# Patient Record
Sex: MALE | Race: BLACK OR AFRICAN AMERICAN | HISPANIC OR LATINO | Employment: UNEMPLOYED | URBAN - METROPOLITAN AREA
[De-identification: names, ages, dates, MRNs, and addresses within clinical notes are randomized per-mention and may not be internally consistent; named-entity substitution may affect disease eponyms.]

---

## 2018-03-04 ENCOUNTER — HOSPITAL ENCOUNTER (EMERGENCY)
Facility: HOSPITAL | Age: 43
Discharge: HOME | End: 2018-03-04
Attending: EMERGENCY MEDICINE

## 2018-03-04 VITALS
SYSTOLIC BLOOD PRESSURE: 122 MMHG | DIASTOLIC BLOOD PRESSURE: 68 MMHG | HEART RATE: 85 BPM | OXYGEN SATURATION: 97 % | HEIGHT: 74 IN | BODY MASS INDEX: 34.31 KG/M2 | RESPIRATION RATE: 18 BRPM | TEMPERATURE: 98 F | WEIGHT: 267.38 LBS

## 2018-03-04 DIAGNOSIS — Z72.0 TOBACCO ABUSE: ICD-10-CM

## 2018-03-04 DIAGNOSIS — L03.116 CELLULITIS AND ABSCESS OF LEFT LEG: Primary | ICD-10-CM

## 2018-03-04 DIAGNOSIS — L02.416 CELLULITIS AND ABSCESS OF LEFT LEG: Primary | ICD-10-CM

## 2018-03-04 PROCEDURE — 87070 CULTURE OTHR SPECIMN AEROBIC: CPT

## 2018-03-04 PROCEDURE — 25000003 PHARM REV CODE 250: Performed by: EMERGENCY MEDICINE

## 2018-03-04 PROCEDURE — 99284 EMERGENCY DEPT VISIT MOD MDM: CPT | Mod: 25

## 2018-03-04 PROCEDURE — 96372 THER/PROPH/DIAG INJ SC/IM: CPT

## 2018-03-04 PROCEDURE — 10022: CPT

## 2018-03-04 PROCEDURE — S0077 INJECTION, CLINDAMYCIN PHOSP: HCPCS | Performed by: EMERGENCY MEDICINE

## 2018-03-04 RX ORDER — CLINDAMYCIN HYDROCHLORIDE 300 MG/1
300 CAPSULE ORAL EVERY 6 HOURS
Qty: 40 CAPSULE | Refills: 0 | Status: SHIPPED | OUTPATIENT
Start: 2018-03-04 | End: 2024-03-27

## 2018-03-04 RX ORDER — LIDOCAINE HYDROCHLORIDE 10 MG/ML
10 INJECTION, SOLUTION EPIDURAL; INFILTRATION; INTRACAUDAL; PERINEURAL
Status: COMPLETED | OUTPATIENT
Start: 2018-03-04 | End: 2018-03-04

## 2018-03-04 RX ORDER — CLINDAMYCIN PHOSPHATE 150 MG/ML
600 INJECTION, SOLUTION INTRAVENOUS
Status: COMPLETED | OUTPATIENT
Start: 2018-03-04 | End: 2018-03-04

## 2018-03-04 RX ADMIN — LIDOCAINE HYDROCHLORIDE 100 MG: 10 INJECTION, SOLUTION EPIDURAL; INFILTRATION; INTRACAUDAL; PERINEURAL at 09:03

## 2018-03-04 RX ADMIN — CLINDAMYCIN PHOSPHATE 600 MG: 150 INJECTION, SOLUTION INTRAMUSCULAR; INTRAVENOUS at 08:03

## 2018-03-04 NOTE — ED PROVIDER NOTES
"Encounter Date: 3/4/2018       History     Chief Complaint   Patient presents with    Leg Pain     pt states "red, swollen area, warm to the touch on left upper leg that's not getting better" thought he might have had a bruise or a pulled muscle, denies injury or drainage     8:32 AM    The patient is a 42-year-old male who presents to the emergency room complaining of pain and sensitivity to the left lateral thigh.  Patient had been losing weight but then plateaued.  He states that he tried some herbal weight loss product which was an IM injection into the left lateral thigh approximately one week ago.  He says that it was sensitive-feeling like a bruise.  He states that it then worsened with redness warmth and erythema.  When he is resting, there is no pain. Redness started worsening and spreading.  When he is walking, pain rated 7 out of 10.  Patient denies any nausea, vomiting, malaise, chills, body aches, fever, weakness or immunosuppression.          Review of patient's allergies indicates:  No Known Allergies  History reviewed. No pertinent past medical history.  Past Surgical History:   Procedure Laterality Date    TONSILLECTOMY       History reviewed. No pertinent family history.  Social History   Substance Use Topics    Smoking status: Current Some Day Smoker     Types: Cigars    Smokeless tobacco: Never Used    Alcohol use Yes      Comment: 2- 3 x a week     Review of Systems   Constitutional: Negative for fever.   HENT: Negative for sore throat.    Respiratory: Negative for shortness of breath.    Cardiovascular: Negative for chest pain.   Gastrointestinal: Negative for nausea.   Genitourinary: Negative for dysuria.   Musculoskeletal: Negative for back pain.   Skin: Positive for rash.   Neurological: Negative for weakness.   Hematological: Does not bruise/bleed easily.       Physical Exam     Initial Vitals [03/04/18 0826]   BP Pulse Resp Temp SpO2   134/83 85 18 98.3 °F (36.8 °C) 99 %      MAP     " "  100           Vitals:    03/04/18 0826 03/04/18 0931   BP: 134/83 122/68   Pulse: 85 85   Resp: 18 18   Temp: 98.3 °F (36.8 °C)    TempSrc: Oral    SpO2: 99% 97%   Weight: 121.3 kg (267 lb 6.4 oz)    Height: 6' 2" (1.88 m)        Physical Exam    Nursing note and vitals reviewed.  Constitutional: He appears well-developed and well-nourished.   HENT:   Head: Normocephalic.   Mouth/Throat: Oropharynx is clear and moist.   Eyes: Pupils are equal, round, and reactive to light.   Neck: Normal range of motion.   Cardiovascular: Normal rate, regular rhythm, normal heart sounds and intact distal pulses.   Pulmonary/Chest: Breath sounds normal.   Abdominal: Soft. Bowel sounds are normal. There is no tenderness. There is no rebound and no guarding.   Musculoskeletal: Normal range of motion.   Neurological: He is alert and oriented to person, place, and time. He has normal strength. No cranial nerve deficit.   Skin: Skin is warm. Capillary refill takes less than 2 seconds. There is erythema.   There is approximately 15 cm area of warmth and erythema on the anterior left thigh wrapping around to the lateral thigh.  There is no inguinal involvement.  See photo   Psychiatric: He has a normal mood and affect. Judgment and thought content normal.               Imaging Results          US Soft Tissue Misc (Final result)  Result time 03/04/18 10:38:43    Final result by Scott Machado MD (03/04/18 10:38:43)                 Impression:        See above      Electronically signed by: SCOTT MACHADO MD  Date:     03/04/18  Time:    10:38              Narrative:    History: Rule out abscess    Procedure: Limited ultrasound    CPT 4: 24223    Findings:    Limited ultrasound of the left leg superior to the knee within the area palpable abnormality demonstrates ill-defined fluid collection within the subcutaneous tissues. This does not have the appearance of a well formed abscess. Aspiration could be performed if there is concern for " abscess.                            Imaging Results          US Soft Tissue Misc (Final result)  Result time 03/04/18 10:38:43    Final result by Scott Machado MD (03/04/18 10:38:43)                 Impression:        See above      Electronically signed by: SCOTT MACHADO MD  Date:     03/04/18  Time:    10:38              Narrative:    History: Rule out abscess    Procedure: Limited ultrasound    CPT 4: 20326    Findings:    Limited ultrasound of the left leg superior to the knee within the area palpable abnormality demonstrates ill-defined fluid collection within the subcutaneous tissues. This does not have the appearance of a well formed abscess. Aspiration could be performed if there is concern for abscess.                              ED Course   I & D - Incision and Drainage  Date/Time: 3/4/2018 10:00 AM  Performed by: SRINI JACOBSON  Authorized by: SRINI JACOBSON   Consent Done: Yes  Consent: Verbal consent obtained.  Consent given by: patient  Type: abscess  Anesthesia: local infiltration    Anesthesia:  Local Anesthetic: lidocaine 1% without epinephrine (4 ml)  Anesthetic total: 4 mL  Description of findings: localized abscess on US.   Specimens: Yes (Abscess)  Comments: Discussed with patient and incision and drainage is the best treatment.  Patient is concerned that he is going out of country to [facility] in 48 hours.   He does not want an open incision.  He states that he know that he will not take care of it as should.   He is requesting needle aspiration.       The area was cleaned with Betadine.  After localized intradermal administration of lidocaine 1%, and 18-gauge needle was introduced into the skin under bedside ultrasound guidance.  3 ML's of purulent/bloody fluid was aspirated.  Another pocket was identified on ultrasound, 18-gauge needle was introduced and aspirated.  One mL of bloody purulent fluid was removed.  Patient tolerated procedure well.  Culture was sent to lab.              Labs Reviewed   CULTURE, AEROBIC  (SPECIFY SOURCE)   CULTURE, AEROBIC  (SPECIFY SOURCE)                 Medications   clindamycin injection 600 mg (600 mg Intramuscular Given 3/4/18 6995)   lidocaine (PF) 10 mg/ml (1%) injection 100 mg (100 mg Infiltration Given by Other 3/4/18 3106)     9:30 AM patient reportedly has a collection of fluid per ultrasound.  At this point time, discussed risks options for incision and drainage.  Patient is concerned because he is going to [place].  Discussed risks including infection, bleeding, failure to drain the abscess,, worsening cellulitis cosmetic deformity.  Benefits include resolution of cellulitis.  At this point time, patient would like to proceed with procedure.      10:14 AM Reassessment: Dr. Samuels reassessed the pt.  The pt is resting comfortably and is NAD.  Pt states their sx have improved. Discussed test results, shared treatment plan, specific conditions for return, and the need for f/u.  Again, it was discussed with patient that opened drainage incision is the best treatment.  Again patient is concerned they will have an open wound in [place].  He is requesting needle aspiration.  Patient is fully aware that the abscess may re-collect, the cellulitis can worsen.  Patient was instructed to return to the emergency room for increasing redness, cloudy drainage, fever, chills, weakness, confusion or other concerns.  Answered their questions at this time.  Pt understands and agrees to the plan.  The pt has remained hemodynamically stable through ED course and is stable for discharge.    Follow-up Information     [facility] In 1 day.    Specialty:  Emergency Medicine  Why:  Return to the ED for: increasing redness, fever, nausea, vomiting, weakness, blister or other concerns.  Contact information:  [address] [address] [address] [phone]                 Discharge Medication List as of 3/4/2018 10:15 AM      START  taking these medications    Details   clindamycin (CLEOCIN) 300 MG capsule Take 1 capsule (300 mg total) by mouth every 6 (six) hours., Starting Sun 3/4/2018, Print              Discharge Medication List as of 3/4/2018 10:15 AM          I discussed with patient and/or family/caretaker that evaluation in the ED does not suggest any emergent or life threatening medical conditions requiring immediate intervention beyond what was provided in the ED, and I believe patient is safe for discharge.  Regardless, an unremarkable evaluation in the ED does not preclude the development or presence of a serious of life threatening condition. As such, patient was instructed to return immediately for any worsening or change in current symptoms.        Regarding TOBACCO USE DISORDER, patient was encouraged to:  make changes to lifestyle and habits to help reduce craving for cigarettes; satisfy oral habits in other ways (eat celery or another low-calorie snack, chew sugarless gum, etc.); only frequent public places and restaurants where smoking is prohibited or restricted; eat regular meals and avoid sweet snacks or candy; and to exercise more frequently.  Patient was educated on setting goals for quitting and complications associated with tobacco use. Resources were provided to patient for smoking cessation opportunities offered in the community.                          Clinical Impression:       ICD-10-CM ICD-9-CM   1. Cellulitis and abscess of left leg L03.116 682.6    L02.416    2. Tobacco abuse Z72.0 305.1         Disposition:   Disposition: Discharged  Condition: Stable                        Eloisa SALBADOR Samuels, DO  03/04/18 1206

## 2018-03-07 LAB — BACTERIA SPEC AEROBE CULT: NO GROWTH

## 2019-05-27 ENCOUNTER — HOSPITAL ENCOUNTER (EMERGENCY)
Facility: HOSPITAL | Age: 44
Discharge: HOME | End: 2019-05-27
Attending: EMERGENCY MEDICINE

## 2019-05-27 VITALS
HEIGHT: 74 IN | WEIGHT: 235 LBS | RESPIRATION RATE: 18 BRPM | TEMPERATURE: 99 F | HEART RATE: 69 BPM | OXYGEN SATURATION: 99 % | SYSTOLIC BLOOD PRESSURE: 108 MMHG | DIASTOLIC BLOOD PRESSURE: 78 MMHG | BODY MASS INDEX: 30.16 KG/M2

## 2019-05-27 DIAGNOSIS — N45.1 BILATERAL EPIDIDYMITIS: Primary | ICD-10-CM

## 2019-05-27 DIAGNOSIS — N50.811 PAIN IN RIGHT TESTICLE: ICD-10-CM

## 2019-05-27 LAB
ALBUMIN SERPL BCP-MCNC: 3.6 G/DL (ref 3.5–5.2)
ALP SERPL-CCNC: 82 U/L (ref 55–135)
ALT SERPL W/O P-5'-P-CCNC: 27 U/L (ref 10–44)
ANION GAP SERPL CALC-SCNC: 9 MMOL/L (ref 8–16)
AST SERPL-CCNC: 20 U/L (ref 10–40)
BASOPHILS # BLD AUTO: 0.03 K/UL (ref 0–0.2)
BASOPHILS NFR BLD: 0.4 % (ref 0–1.9)
BILIRUB SERPL-MCNC: 0.4 MG/DL (ref 0.1–1)
BILIRUB UR QL STRIP: NEGATIVE
BUN SERPL-MCNC: 13 MG/DL (ref 6–20)
CALCIUM SERPL-MCNC: 9.8 MG/DL (ref 8.7–10.5)
CHLORIDE SERPL-SCNC: 106 MMOL/L (ref 95–110)
CLARITY UR: CLEAR
CO2 SERPL-SCNC: 25 MMOL/L (ref 23–29)
COLOR UR: YELLOW
CREAT SERPL-MCNC: 1.1 MG/DL (ref 0.5–1.4)
DIFFERENTIAL METHOD: ABNORMAL
EOSINOPHIL # BLD AUTO: 0.2 K/UL (ref 0–0.5)
EOSINOPHIL NFR BLD: 2.4 % (ref 0–8)
ERYTHROCYTE [DISTWIDTH] IN BLOOD BY AUTOMATED COUNT: 13.2 % (ref 11.5–14.5)
EST. GFR  (AFRICAN AMERICAN): >60 ML/MIN/1.73 M^2
EST. GFR  (NON AFRICAN AMERICAN): >60 ML/MIN/1.73 M^2
GLUCOSE SERPL-MCNC: 93 MG/DL (ref 70–110)
GLUCOSE UR QL STRIP: NEGATIVE
HCT VFR BLD AUTO: 52 % (ref 40–54)
HGB BLD-MCNC: 18.2 G/DL (ref 14–18)
HGB UR QL STRIP: NEGATIVE
KETONES UR QL STRIP: NEGATIVE
LEUKOCYTE ESTERASE UR QL STRIP: NEGATIVE
LYMPHOCYTES # BLD AUTO: 0.7 K/UL (ref 1–4.8)
LYMPHOCYTES NFR BLD: 9.1 % (ref 18–48)
MCH RBC QN AUTO: 33.9 PG (ref 27–31)
MCHC RBC AUTO-ENTMCNC: 35 G/DL (ref 32–36)
MCV RBC AUTO: 97 FL (ref 82–98)
MONOCYTES # BLD AUTO: 0.8 K/UL (ref 0.3–1)
MONOCYTES NFR BLD: 9.9 % (ref 4–15)
NEUTROPHILS # BLD AUTO: 6.1 K/UL (ref 1.8–7.7)
NEUTROPHILS NFR BLD: 77.9 % (ref 38–73)
NITRITE UR QL STRIP: NEGATIVE
PH UR STRIP: 5 [PH] (ref 5–8)
PLATELET # BLD AUTO: 149 K/UL (ref 150–350)
PMV BLD AUTO: 12.1 FL (ref 9.2–12.9)
POTASSIUM SERPL-SCNC: 4.3 MMOL/L (ref 3.5–5.1)
PROT SERPL-MCNC: 7 G/DL (ref 6–8.4)
PROT UR QL STRIP: NEGATIVE
RBC # BLD AUTO: 5.37 M/UL (ref 4.6–6.2)
SODIUM SERPL-SCNC: 140 MMOL/L (ref 136–145)
SP GR UR STRIP: 1.02 (ref 1–1.03)
URN SPEC COLLECT METH UR: NORMAL
UROBILINOGEN UR STRIP-ACNC: NEGATIVE EU/DL
WBC # BLD AUTO: 7.78 K/UL (ref 3.9–12.7)

## 2019-05-27 PROCEDURE — 25000003 PHARM REV CODE 250: Performed by: EMERGENCY MEDICINE

## 2019-05-27 PROCEDURE — 96361 HYDRATE IV INFUSION ADD-ON: CPT

## 2019-05-27 PROCEDURE — 85025 COMPLETE CBC W/AUTO DIFF WBC: CPT

## 2019-05-27 PROCEDURE — 80053 COMPREHEN METABOLIC PANEL: CPT

## 2019-05-27 PROCEDURE — 99284 EMERGENCY DEPT VISIT MOD MDM: CPT | Mod: 25

## 2019-05-27 PROCEDURE — 63600175 PHARM REV CODE 636 W HCPCS: Performed by: EMERGENCY MEDICINE

## 2019-05-27 PROCEDURE — 81003 URINALYSIS AUTO W/O SCOPE: CPT

## 2019-05-27 PROCEDURE — 96365 THER/PROPH/DIAG IV INF INIT: CPT

## 2019-05-27 RX ORDER — CIPROFLOXACIN 500 MG/1
500 TABLET ORAL 2 TIMES DAILY
Qty: 20 TABLET | Refills: 0 | Status: SHIPPED | OUTPATIENT
Start: 2019-05-27 | End: 2019-06-06

## 2019-05-27 RX ORDER — DOXYCYCLINE 100 MG/1
100 CAPSULE ORAL 2 TIMES DAILY
Qty: 20 CAPSULE | Refills: 0 | Status: SHIPPED | OUTPATIENT
Start: 2019-05-27 | End: 2019-06-06

## 2019-05-27 RX ORDER — IBUPROFEN 600 MG/1
600 TABLET ORAL EVERY 6 HOURS PRN
Qty: 20 TABLET | Refills: 0 | Status: SHIPPED | OUTPATIENT
Start: 2019-05-27

## 2019-05-27 RX ORDER — AZITHROMYCIN 250 MG/1
1000 TABLET, FILM COATED ORAL
Status: COMPLETED | OUTPATIENT
Start: 2019-05-27 | End: 2019-05-27

## 2019-05-27 RX ADMIN — AZITHROMYCIN MONOHYDRATE 1000 MG: 250 TABLET ORAL at 11:05

## 2019-05-27 RX ADMIN — CEFTRIAXONE 2 G: 2 INJECTION, SOLUTION INTRAVENOUS at 11:05

## 2019-05-27 RX ADMIN — SODIUM CHLORIDE 1000 ML: 0.9 INJECTION, SOLUTION INTRAVENOUS at 09:05

## 2019-05-27 NOTE — ED PROVIDER NOTES
Encounter Date: 5/27/2019    SCRIBE #1 NOTE: I, Sarah Cespedes, am scribing for, and in the presence of,  Yogi Galo MD. I have scribed the following portions of the note - Other sections scribed: HPI, ROS, PE.       History     Chief Complaint   Patient presents with    Flank Pain     +bilateral flank pain x 5 days, reports foul smelling odor in urine, with dark color. Pt denies N/V/F/C. NAD. VSS.      CC: Flank Pain    HPI: This 43 y.o male presents to the ED for an evaluation of acute onset, constant bilateral lower back pain described as a dull aches for the past 5 days.  Patient also reports 4 days ago he began having suprapubic abdominal pain and reports 2 days ago he began having right testicular pain, mild dysuria, and scrotal swelling.  Patient reports attempting treatment with an oral antibiotic previously prescribed to his wife to treat a UTI; he can not recall the name of the medication at this time.   He also reports attempting treatment with 800 mg Ibuprofen without relief.  Patient states in the past 24 hours he has had 3 episodes of diarrhea, but reports he thinks it is associated with a recent change in his diet.  Patient denies fever, chills, nausea, emesis, cough, rhinorrhea, rash, hematuria, or any other associated symptoms.  No alleviating factors.    The history is provided by the patient. No  was used.     Review of patient's allergies indicates:  No Known Allergies  History reviewed. No pertinent past medical history.  Past Surgical History:   Procedure Laterality Date    TONSILLECTOMY       History reviewed. No pertinent family history.  Social History     Tobacco Use    Smoking status: Current Some Day Smoker     Types: Cigars    Smokeless tobacco: Never Used   Substance Use Topics    Alcohol use: Yes     Comment: 2- 3 x a week    Drug use: No     Review of Systems   Constitutional: Negative for chills and fever.   HENT: Negative for ear pain and sore throat.     Eyes: Negative for pain.   Respiratory: Negative for cough and shortness of breath.    Cardiovascular: Negative for chest pain.   Gastrointestinal: Positive for abdominal pain and diarrhea. Negative for nausea and vomiting.   Genitourinary: Positive for dysuria, scrotal swelling and testicular pain (right). Negative for discharge, frequency and hematuria.   Musculoskeletal: Negative for back pain.        (-) arm or leg problems   Skin: Negative for rash.   Neurological: Negative for headaches.       Physical Exam     Initial Vitals [05/27/19 0837]   BP Pulse Resp Temp SpO2   137/86 78 18 98.6 °F (37 °C) 99 %      MAP       --         Physical Exam  Physical Exam  The patient was examined specifically for the following:   General:No significant distress, Good color, Warm and dry. Head and neck:Scalp atraumatic, Neck supple. Neurological:Appropriate conversation, Gross motor deficits. Eyes:Conjugate gaze, Clear corneas. ENT: No epistaxis. Cardiac: Regular rate and rhythm, Grossly normal heart tones. Pulmonary: Wheezing, Rales. Gastrointestinal: Abdominal tenderness, Abdominal distention. Musculoskeletal: Extremity deformity, Apparent pain with range of motion of the joints. Skin: Rash.   The findings on examination were normal except for the following:  The skin of the scrotum is uniformly red.  The patient has a swollen tender right testicle.  There is no penile discharge. There is minimal suprapubic tenderness.  There is minimal lumbar back tenderness. There is no significant midline tenderness.  There is no costovertebral angle tenderness.  The patient is afebrile.    ED Course   Procedures  Labs Reviewed   CBC W/ AUTO DIFFERENTIAL - Abnormal; Notable for the following components:       Result Value    Hemoglobin 18.2 (*)     Mean Corpuscular Hemoglobin 33.9 (*)     Platelets 149 (*)     Lymph # 0.7 (*)     Gran% 77.9 (*)     Lymph% 9.1 (*)     All other components within normal limits   C. TRACHOMATIS/N.  GONORRHOEAE BY AMP DNA   URINALYSIS, REFLEX TO URINE CULTURE    Narrative:     Preferred Collection Type->Urine, Clean Catch   COMPREHENSIVE METABOLIC PANEL          Imaging Results           US Scrotum And Testicles (Final result)  Result time 05/27/19 11:13:17    Final result by Maria A Murray MD (05/27/19 11:13:17)                 Impression:      Both epididymi appear hypervascular possibly representing epididymitis.    This report was flagged in Epic as abnormal.      Electronically signed by: Maria A Murray MD  Date:    05/27/2019  Time:    11:13             Narrative:    EXAMINATION:  US SCROTUM AND TESTICLES    CLINICAL HISTORY:  Right testicular pain    TECHNIQUE:  Sonography of the scrotum and testes.    COMPARISON:  None.    FINDINGS:  The right testicle measures 3.2 x 2.2 x 2.6 cm with normal flow and no evidence intratesticular mass lesion.    The left testicle measures 3.7 x 2.3 x 2.5 cm with normal flow and no evidence intratesticular mass lesion.  Both epididymi appear hypervascular..                              Medical decision making:  Given the above, this patient presents to the emergency room with pain in the bilateral low lumbar back that is achy.  He also has bilateral scrotal pain. It is worse on the right than on the left.  Ultrasound is suspicious for epididymitis.  I believe this would fit his clinical picture.  I do not see white cells in the urine.  I will treat with Rocephin and Zithromax in the emergency room and discharged on Cipro and doxycycline.  I will have the patient follow up with Urology.  GC and chlamydia are pending.  The back pain is bilateral and low.  I doubt ureteral calculus and pyelonephritis.                Scribe Attestation:   Scribe #1: I performed the above scribed service and the documentation accurately describes the services I performed. I attest to the accuracy of the note.    Attending Attestation:           Physician Attestation for Scribe:  Physician  Attestation Statement for Scribe #1: I, Yogi Galo MS, reviewed documentation, as scribed by Sarah Cespedes in my presence, and it is both accurate and complete.                    Clinical Impression:       ICD-10-CM ICD-9-CM   1. Bilateral epididymitis N45.1 604.90   2. Pain in right testicle N50.811 608.9                                Yogi Galo MD  05/27/19 1132

## 2019-05-27 NOTE — DISCHARGE INSTRUCTIONS
Cipro and doxycycline as directed.  Sen jain, no boxers.  He may wear jockstrap.  You must provide scrotal support.  Please follow-up with the urologist above.  Return immediately if you get worse or if new problems develop.  Ibuprofen for pain. No sex for 7 days.  Please advise your sexual partners of the possible need for treatment.

## 2019-05-27 NOTE — ED TRIAGE NOTES
Pt arrived to the ED via POV from home with c/o flank pain. Pt states he's been having right and left sided pain for the past six days and abdominal pain. Pt denies chest pain/discomfort, n/v/d, SOB, dizziness/weakness, urination symptoms. On admit to the ED bed, pt AAOx4, NAD noted, VSS. Pt placed on cardiac monitor, pulse ox and BP cuff. Rates pain 7/10 on pain scale.

## 2020-08-30 ENCOUNTER — HOSPITAL ENCOUNTER (EMERGENCY)
Facility: HOSPITAL | Age: 45
Discharge: HOME | End: 2020-08-30
Attending: EMERGENCY MEDICINE
Payer: MEDICAID

## 2020-08-30 VITALS
TEMPERATURE: 99 F | WEIGHT: 270 LBS | RESPIRATION RATE: 16 BRPM | SYSTOLIC BLOOD PRESSURE: 146 MMHG | DIASTOLIC BLOOD PRESSURE: 82 MMHG | BODY MASS INDEX: 34.65 KG/M2 | HEIGHT: 74 IN | HEART RATE: 72 BPM | OXYGEN SATURATION: 99 %

## 2020-08-30 DIAGNOSIS — M25.461 EFFUSION OF BURSA OF RIGHT KNEE: ICD-10-CM

## 2020-08-30 DIAGNOSIS — M16.11 OSTEOARTHRITIS OF RIGHT HIP, UNSPECIFIED OSTEOARTHRITIS TYPE: Primary | ICD-10-CM

## 2020-08-30 DIAGNOSIS — M25.571 RIGHT ANKLE PAIN: ICD-10-CM

## 2020-08-30 DIAGNOSIS — M25.561 RIGHT KNEE PAIN: ICD-10-CM

## 2020-08-30 DIAGNOSIS — M25.551 RIGHT HIP PAIN: ICD-10-CM

## 2020-08-30 DIAGNOSIS — M77.31 CALCANEAL SPUR OF FOOT, RIGHT: ICD-10-CM

## 2020-08-30 PROBLEM — Z72.0 TOBACCO ABUSE: Status: ACTIVE | Noted: 2019-11-07

## 2020-08-30 PROBLEM — I47.29 NSVT (NONSUSTAINED VENTRICULAR TACHYCARDIA): Status: ACTIVE | Noted: 2019-11-07

## 2020-08-30 PROBLEM — I50.22 CHRONIC SYSTOLIC HEART FAILURE: Status: ACTIVE | Noted: 2020-01-10

## 2020-08-30 PROBLEM — N52.01 ERECTILE DYSFUNCTION DUE TO ARTERIAL INSUFFICIENCY: Status: ACTIVE | Noted: 2020-06-11

## 2020-08-30 PROBLEM — Z95.810 ICD (IMPLANTABLE CARDIOVERTER-DEFIBRILLATOR), SINGLE, IN SITU: Status: ACTIVE | Noted: 2020-01-10

## 2020-08-30 PROBLEM — Z12.5 SCREENING FOR PROSTATE CANCER: Status: ACTIVE | Noted: 2020-06-11

## 2020-08-30 PROBLEM — I25.5 ISCHEMIC CARDIOMYOPATHY: Status: ACTIVE | Noted: 2019-11-07

## 2020-08-30 PROBLEM — I25.10 CORONARY ARTERY DISEASE INVOLVING NATIVE CORONARY ARTERY OF NATIVE HEART WITHOUT ANGINA PECTORIS: Status: ACTIVE | Noted: 2019-11-15

## 2020-08-30 PROBLEM — E78.5 HYPERLIPIDEMIA: Status: ACTIVE | Noted: 2019-11-07

## 2020-08-30 PROCEDURE — 99283 EMERGENCY DEPT VISIT LOW MDM: CPT | Mod: 25,ER

## 2020-08-30 RX ORDER — NAPROXEN SODIUM 220 MG/1
81 TABLET, FILM COATED ORAL
COMMUNITY
Start: 2020-01-29

## 2020-08-30 RX ORDER — SILDENAFIL CITRATE 20 MG/1
20 TABLET ORAL
COMMUNITY
Start: 2020-06-25

## 2020-08-30 RX ORDER — CARVEDILOL 12.5 MG/1
12.5 TABLET ORAL 2 TIMES DAILY
COMMUNITY
Start: 2020-08-26

## 2020-08-30 RX ORDER — CLOPIDOGREL BISULFATE 75 MG/1
75 TABLET ORAL DAILY
COMMUNITY
Start: 2020-08-26

## 2020-08-30 RX ORDER — TRAMADOL HYDROCHLORIDE 50 MG/1
50 TABLET ORAL EVERY 6 HOURS PRN
Qty: 12 TABLET | Refills: 0 | Status: SHIPPED | OUTPATIENT
Start: 2020-08-30

## 2020-08-30 RX ORDER — LISINOPRIL 2.5 MG/1
2.5 TABLET ORAL 2 TIMES DAILY
COMMUNITY
Start: 2020-08-26

## 2020-08-30 RX ORDER — NITROGLYCERIN 0.4 MG/1
TABLET SUBLINGUAL
COMMUNITY
Start: 2020-01-29

## 2020-08-30 RX ORDER — FUROSEMIDE 20 MG/1
20 TABLET ORAL
COMMUNITY
Start: 2020-08-26

## 2020-08-30 RX ORDER — ATORVASTATIN CALCIUM 80 MG/1
80 TABLET, FILM COATED ORAL DAILY
COMMUNITY
Start: 2020-08-26

## 2020-08-30 NOTE — ED PROVIDER NOTES
Encounter Date: 8/30/2020    SCRIBE #1 NOTE: I, Lyudmila Caban, am scribing for, and in the presence of,  Micaela Brown NP. I have scribed the following portions of the note - Other sections scribed: HPI, ROS, PE.       History     Chief Complaint   Patient presents with    Knee Pain     states RIGHT ANKLE, SHIN, AND KNEE PAIN x1 week. states worse after work. DENIES swelling and redness and trauma.    Ankle Pain     Van Fonseca is a 45 y.o. male who presents to the ED complaining of chronic right leg pain onset couple of weeks.  He reports right ankle pain, right knee pain, right shin pain, and right hip pain. He reports he has been working a lot lately and states the pain is worse when he moves and bends his leg, and the pain is not so bad when he rests. Patient states he could not go to work today because of the pain. Attempted treatment with Tylenol. No known allergies. Denies any recent injuries or falls. He states he has never had an x-ray of his leg before. Patient reports he had a heart attack in the past.  He is compliant with his medications.  He has no PCP. Denies back pain or SOB.    The history is provided by the patient. No  was used.     Review of patient's allergies indicates:  No Known Allergies  No past medical history on file.  Past Surgical History:   Procedure Laterality Date    TONSILLECTOMY       No family history on file.  Social History     Tobacco Use    Smoking status: Current Some Day Smoker     Types: Cigars    Smokeless tobacco: Never Used   Substance Use Topics    Alcohol use: Yes     Comment: 2- 3 x a week    Drug use: No     Review of Systems   Constitutional: Negative for chills and fever.   Respiratory: Negative for shortness of breath.    Cardiovascular: Negative for chest pain and leg swelling.   Gastrointestinal: Negative for abdominal pain, diarrhea, nausea and vomiting.   Musculoskeletal: Positive for arthralgias (right hip pain) and myalgias (right  leg, shin, ankle pain). Negative for back pain.   Neurological: Negative for weakness and numbness.   All other systems reviewed and are negative.      Physical Exam     Initial Vitals [08/30/20 1545]   BP Pulse Resp Temp SpO2   (!) 143/90 79 18 99.4 °F (37.4 °C) 98 %      MAP       --         Physical Exam    Nursing note and vitals reviewed.  Constitutional: He appears well-developed and well-nourished.   HENT:   Head: Normocephalic and atraumatic.   Eyes: Conjunctivae are normal.   Neck: Normal range of motion. Neck supple.   Cardiovascular: Normal rate, regular rhythm and normal heart sounds. Exam reveals no gallop and no friction rub.    No murmur heard.  Pulmonary/Chest: Breath sounds normal. No respiratory distress. He has no wheezes. He has no rhonchi. He has no rales.   Abdominal: Soft. There is no abdominal tenderness.   Musculoskeletal: Normal range of motion. No edema.      Right hip: Normal.      Right knee: Normal.        Right ankle: Normal.      Lumbar back: Normal.      Comments: Patient is ambulatory without assistance or antalgic gait.  Full range of motion of all joints.  Patient reports pain with range range of motion of right hip.  No calf pain, calf swelling, calf tenderness.  No wounds or breaks in skin integrity.  Good pulses and brisk capillary refill   Neurological: He is alert and oriented to person, place, and time. GCS score is 15. GCS eye subscore is 4. GCS verbal subscore is 5. GCS motor subscore is 6.   Skin: Skin is warm and dry.   Psychiatric: He has a normal mood and affect.         ED Course   Procedures  Labs Reviewed - No data to display       Imaging Results          X-Ray Ankle Complete Right (Final result)  Result time 08/30/20 17:03:17    Final result by Kodak De Leon MD (08/30/20 17:03:17)                 Impression:      Mild chronic/degenerative changes.  No acute radiographic abnormality.      Electronically signed by: Kodak  Adrienne  Date:    08/30/2020  Time:    17:03             Narrative:    EXAMINATION:  XR ANKLE COMPLETE 3 VIEW RIGHT    CLINICAL HISTORY:  Pain in right ankle and joints of right foot    TECHNIQUE:  AP, lateral, and oblique images of the right ankle were performed.    COMPARISON:  None    FINDINGS:  Alignment is satisfactory.  No acute fracture, subluxation or dislocation.    Small calcific focus adjacent to the medial malleolus could represent small accessory ossicle or related to remote trauma.  No acute fracture is detected.    Calcaneal spurring inferiorly and posteriorly.    Ankle mortise appears intact.    Soft tissues appear within normal limits.                               X-Ray Hip 2 View Right (Final result)  Result time 08/30/20 17:09:01    Final result by Kodak De Leon MD (08/30/20 17:09:01)                 Impression:      Probable degenerative changes of the right hip greater than expected for the patient's chronologic age.  Recommend follow-up.  No acute radiographic abnormality.      Electronically signed by: Kodak De Leon  Date:    08/30/2020  Time:    17:09             Narrative:    EXAMINATION:  XR HIP 2 VIEW RIGHT    CLINICAL HISTORY:  Pain in right hip    TECHNIQUE:  AP view of the pelvis and frog leg lateral view of the right hip were performed.    COMPARISON:  None    FINDINGS:  Moderate to severe joint space narrowing the right hip.  Prominent acetabular spurring at the superolateral margin.    Mild osteophytic spurring of the femoral head.    No acute fracture, subluxation or dislocation.  No evidence of avascular necrosis.                               X-Ray Knee 3 View Right (Final result)  Result time 08/30/20 17:06:33    Final result by Kodak De Leon MD (08/30/20 17:06:33)                 Impression:      1. No acute osseous abnormality.  2. Small joint effusion.      Electronically signed by: Kodak De Leon  Date:    08/30/2020  Time:    17:06             Narrative:     EXAMINATION:  XR KNEE 3 VIEW RIGHT    CLINICAL HISTORY:  Pain in right knee    TECHNIQUE:  AP, lateral, and Merchant views of the right knee were performed.    COMPARISON:  None    FINDINGS:  Alignment is satisfactory.  No acute fracture, subluxation or dislocation.  No mass or foreign body.  Small joint effusion.  Joint spaces appear adequately maintained.                                 Medical Decision Making:   History:   Old Medical Records: I decided to obtain old medical records.  Independently Interpreted Test(s):   I have ordered and independently interpreted X-rays - see prior notes.  Clinical Tests:   Radiological Study: Ordered and Reviewed  ED Management:  45 year male presenting the ED with right leg pain worse with ambulation and movement.  No injury or trauma.  Patient has good pulses and brisk capillary refill.  Doubt PAD. No calf pain, calf tenderness, calf swelling.  Doubt DVT.  X-rays are negative for acute fracture dislocation.  Patient has degenerative changes.  Left patient follow up with PCP and Orthopedics.    Based on my clinical evaluation, I do not appreciate any immediate, emergent, or life threatening condition or etiology that warrants additional workup today.  I feel the patient can be discharged with close follow-up care.            Scribe Attestation:   Scribe #1: I performed the above scribed service and the documentation accurately describes the services I performed. I attest to the accuracy of the note.     Scribe attestation: I, ADALGISA Brown, personally performed the services described in this documentation. All medical record entries made by the scribe were at my direction and in my presence.  I have reviewed the chart and agree that the record reflects my personal performance and is accurate and complete.                      Clinical Impression:     1. Osteoarthritis of right hip, unspecified osteoarthritis type    2. Right ankle pain    3. Right hip pain    4. Right knee pain     5. Calcaneal spur of foot, right    6. Effusion of bursa of right knee            Disposition:   Disposition: Discharged  Condition: Stable     ED Disposition Condition    Discharge Stable        ED Prescriptions     Medication Sig Dispense Start Date End Date Auth. Provider    traMADoL (ULTRAM) 50 mg tablet Take 1 tablet (50 mg total) by mouth every 6 (six) hours as needed for Pain. 12 tablet 8/30/2020  Micaela Brown NP        Follow-up Information     Follow up With Specialties Details Why Contact Info    [facility]  Schedule an appointment as soon as possible for a visit in 1 day For follow-up if you do not have a primary care doctor [address] [place] [address]  [phone]      Tony King MD Internal Medicine Schedule an appointment as soon as possible for a visit  For follow-up [address] [place] [address] [address]  [facility] [address]  [phone]      [facility] Emergency Department Emergency Medicine Go to  If symptoms worsen [address] [facility] [address] [phone]                                     Micaela Brown NP  08/30/20 2051

## 2020-08-30 NOTE — DISCHARGE INSTRUCTIONS
You have been prescribed TRAMADOL for pain. Please do not take this medication while working, drinking alcohol, swimming, or while driving/operating heavy machinery. This medication may cause drowsiness, impair judgment, and reduce physical capabilities.    Please return to the Emergency Department for any new or worsening symptoms including: fever, chest pain, shortness of breath, loss of consciousness, dizziness, weakness, or any other concerns.     Please follow up with your Primary Care Provider within in the week. If you do not have one, you may contact the one listed on your discharge paperwork or you may also call the [facility] Appointment Desk at [phone] to schedule an appointment with one.     Please take all medication as prescribed.

## 2020-08-30 NOTE — Clinical Note
Van Fonseca was seen and treated in our emergency department on 8/30/2020.  He may return to work on 09/02/2020.       If you have any questions or concerns, please don't hesitate to call.      Micaela Escudero RN

## 2022-05-24 DIAGNOSIS — M25.552 BILATERAL HIP PAIN: Primary | ICD-10-CM

## 2022-05-24 DIAGNOSIS — M25.551 BILATERAL HIP PAIN: Primary | ICD-10-CM

## 2022-05-31 ENCOUNTER — OFFICE VISIT (OUTPATIENT)
Dept: ORTHOPEDICS | Facility: CLINIC | Age: 47
End: 2022-05-31
Payer: MEDICAID

## 2022-05-31 VITALS
BODY MASS INDEX: 39 KG/M2 | SYSTOLIC BLOOD PRESSURE: 124 MMHG | HEIGHT: 74 IN | DIASTOLIC BLOOD PRESSURE: 82 MMHG | WEIGHT: 303.88 LBS | HEART RATE: 79 BPM

## 2022-05-31 DIAGNOSIS — M16.11 PRIMARY OSTEOARTHRITIS OF RIGHT HIP: Primary | ICD-10-CM

## 2022-05-31 DIAGNOSIS — M16.12 PRIMARY OSTEOARTHRITIS OF LEFT HIP: ICD-10-CM

## 2022-05-31 PROCEDURE — 1160F PR REVIEW ALL MEDS BY PRESCRIBER/CLIN PHARMACIST DOCUMENTED: ICD-10-PCS | Mod: CPTII,,, | Performed by: ORTHOPAEDIC SURGERY

## 2022-05-31 PROCEDURE — 4010F ACE/ARB THERAPY RXD/TAKEN: CPT | Mod: CPTII,,, | Performed by: ORTHOPAEDIC SURGERY

## 2022-05-31 PROCEDURE — 1160F RVW MEDS BY RX/DR IN RCRD: CPT | Mod: CPTII,,, | Performed by: ORTHOPAEDIC SURGERY

## 2022-05-31 PROCEDURE — 3079F DIAST BP 80-89 MM HG: CPT | Mod: CPTII,,, | Performed by: ORTHOPAEDIC SURGERY

## 2022-05-31 PROCEDURE — 99999 PR PBB SHADOW E&M-EST. PATIENT-LVL IV: CPT | Mod: PBBFAC,,, | Performed by: ORTHOPAEDIC SURGERY

## 2022-05-31 PROCEDURE — 4010F PR ACE/ARB THEARPY RXD/TAKEN: ICD-10-PCS | Mod: CPTII,,, | Performed by: ORTHOPAEDIC SURGERY

## 2022-05-31 PROCEDURE — 99203 PR OFFICE/OUTPT VISIT, NEW, LEVL III, 30-44 MIN: ICD-10-PCS | Mod: S$PBB,,, | Performed by: ORTHOPAEDIC SURGERY

## 2022-05-31 PROCEDURE — 3079F PR MOST RECENT DIASTOLIC BLOOD PRESSURE 80-89 MM HG: ICD-10-PCS | Mod: CPTII,,, | Performed by: ORTHOPAEDIC SURGERY

## 2022-05-31 PROCEDURE — 3008F BODY MASS INDEX DOCD: CPT | Mod: CPTII,,, | Performed by: ORTHOPAEDIC SURGERY

## 2022-05-31 PROCEDURE — 99214 OFFICE O/P EST MOD 30 MIN: CPT | Mod: PBBFAC,PN | Performed by: ORTHOPAEDIC SURGERY

## 2022-05-31 PROCEDURE — 99999 PR PBB SHADOW E&M-EST. PATIENT-LVL IV: ICD-10-PCS | Mod: PBBFAC,,, | Performed by: ORTHOPAEDIC SURGERY

## 2022-05-31 PROCEDURE — 1159F PR MEDICATION LIST DOCUMENTED IN MEDICAL RECORD: ICD-10-PCS | Mod: CPTII,,, | Performed by: ORTHOPAEDIC SURGERY

## 2022-05-31 PROCEDURE — 99203 OFFICE O/P NEW LOW 30 MIN: CPT | Mod: S$PBB,,, | Performed by: ORTHOPAEDIC SURGERY

## 2022-05-31 PROCEDURE — 1159F MED LIST DOCD IN RCRD: CPT | Mod: CPTII,,, | Performed by: ORTHOPAEDIC SURGERY

## 2022-05-31 PROCEDURE — 3074F SYST BP LT 130 MM HG: CPT | Mod: CPTII,,, | Performed by: ORTHOPAEDIC SURGERY

## 2022-05-31 PROCEDURE — 3008F PR BODY MASS INDEX (BMI) DOCUMENTED: ICD-10-PCS | Mod: CPTII,,, | Performed by: ORTHOPAEDIC SURGERY

## 2022-05-31 PROCEDURE — 3074F PR MOST RECENT SYSTOLIC BLOOD PRESSURE < 130 MM HG: ICD-10-PCS | Mod: CPTII,,, | Performed by: ORTHOPAEDIC SURGERY

## 2022-05-31 RX ORDER — METFORMIN HYDROCHLORIDE 750 MG/1
TABLET, EXTENDED RELEASE ORAL
COMMUNITY
Start: 2022-05-19

## 2022-05-31 RX ORDER — LIDOCAINE 36 MG/1
PATCH TOPICAL
COMMUNITY
Start: 2022-05-23

## 2022-05-31 RX ORDER — DULAGLUTIDE 0.75 MG/.5ML
0.75 INJECTION, SOLUTION SUBCUTANEOUS
COMMUNITY
Start: 2022-04-26

## 2022-05-31 RX ORDER — DAPAGLIFLOZIN 10 MG/1
10 TABLET, FILM COATED ORAL DAILY
COMMUNITY
Start: 2022-05-19

## 2022-05-31 RX ORDER — MELOXICAM 7.5 MG/1
7.5 TABLET ORAL 2 TIMES DAILY
COMMUNITY
Start: 2022-02-28

## 2022-05-31 RX ORDER — SILDENAFIL 100 MG/1
100 TABLET, FILM COATED ORAL
COMMUNITY
Start: 2022-04-16

## 2022-05-31 RX ORDER — DICLOFENAC SODIUM 10 MG/G
GEL TOPICAL
COMMUNITY
Start: 2022-05-24

## 2022-05-31 RX ORDER — ROSUVASTATIN CALCIUM 40 MG/1
40 TABLET, COATED ORAL DAILY
COMMUNITY
Start: 2022-05-23

## 2022-06-04 PROBLEM — M16.12 PRIMARY OSTEOARTHRITIS OF LEFT HIP: Status: ACTIVE | Noted: 2022-06-04

## 2022-06-04 PROBLEM — M16.11 PRIMARY OSTEOARTHRITIS OF RIGHT HIP: Status: ACTIVE | Noted: 2022-06-04

## 2022-06-04 NOTE — PROGRESS NOTES
Subjective:       Patient ID: Van Fonseca is a 46 y.o. male.    Chief Complaint:   Pain of the Right Hip and Pain of the Left Hip  He comes in for bilateral hip pain for the last few years.  Pain is 8/10 in bilateral hips, also involving the back and the knees as well.  He has bad low back pain on a chronic basis.  He has seen Pain Management but has not had any recent treatment.  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  No fall, accident, injury, history of pertinent surgery.  He has daily pain with ADLs and has to have his wife put his socks on.  He has intermittent night pain interfering with sleep.  He has an appointment at [facility] in a week or 2 for this same diagnosis.    History reviewed. No pertinent past medical history.  Past Surgical History:   Procedure Laterality Date    TONSILLECTOMY       History reviewed. No pertinent family history.  Social History     Socioeconomic History    Marital status:    Tobacco Use    Smoking status: Current Some Day Smoker     Types: Cigars    Smokeless tobacco: Never Used   Substance and Sexual Activity    Alcohol use: Yes     Comment: 2- 3 x a week    Drug use: No       Current Outpatient Medications   Medication Sig Dispense Refill    aspirin 81 MG Chew Take 81 mg by mouth.      atorvastatin (LIPITOR) 80 MG tablet Take 80 mg by mouth once daily.      carvediloL (COREG) 12.5 MG tablet Take 12.5 mg by mouth 2 (two) times daily.      clopidogreL (PLAVIX) 75 mg tablet Take 75 mg by mouth once daily.      diclofenac sodium (VOLTAREN) 1 % Gel SMARTSIG:Gram(s) Topical 3 Times Daily      FARXIGA 10 mg tablet Take 10 mg by mouth once daily.      ibuprofen (ADVIL,MOTRIN) 600 MG tablet Take 1 tablet (600 mg total) by mouth every 6 (six) hours as needed for Pain. 20 tablet 0    lisinopriL (PRINIVIL,ZESTRIL) 2.5 MG tablet Take 2.5 mg by mouth 2 (two) times a day.       "meloxicam (MOBIC) 7.5 MG tablet Take 7.5 mg by mouth 2 (two) times daily.      metFORMIN (GLUCOPHAGE-XR) 750 MG ER 24hr tablet SMARTSI Tablet(s) By Mouth Every Evening      nitroGLYCERIN (NITROSTAT) 0.4 MG SL tablet Sit down. Place 1 tab under the tongue every 5 minutes for chest pain. Max 3 tabs in 15 minutes. Call 911 for unrelieved chest pain.      rosuvastatin (CRESTOR) 40 MG Tab Take 40 mg by mouth once daily.      sildenafiL (VIAGRA) 100 MG tablet Take 100 mg by mouth as needed.      TRULICITY 0.75 mg/0.5 mL pen injector Inject 0.75 mg into the skin every 7 days.      ZTLIDO 1.8 % PtMd Apply topically.      clindamycin (CLEOCIN) 300 MG capsule Take 1 capsule (300 mg total) by mouth every 6 (six) hours. (Patient not taking: Reported on 2022) 40 capsule 0    furosemide (LASIX) 20 MG tablet Take 20 mg by mouth.      sildenafil (REVATIO) 20 mg Tab Take 20 mg by mouth.      traMADoL (ULTRAM) 50 mg tablet Take 1 tablet (50 mg total) by mouth every 6 (six) hours as needed for Pain. (Patient not taking: Reported on 2022) 12 tablet 0     No current facility-administered medications for this visit.     Review of patient's allergies indicates:  No Known Allergies      Objective:      Vitals:    22 1424   BP: 124/82   BP Location: Right arm   Patient Position: Sitting   BP Method: Large (Automatic)   Pulse: 79   Weight: (!) 137.9 kg (303 lb 14.5 oz)   Height: 6' 2" (1.88 m)     Physical Exam  Positive C sign, positive Stinchfield sign.  No tenderness at the greater trochanter or iliotibial band.  No tenderness at the SI joint.  The patient has pain in the groin with passive flexion and internal rotation of the hip which is limited.  Knee benign without tenderness or effusion, with normal range of motion.  Skin intact.  Distal neurovascular intact.  Flip test negative.    Imaging Review:   X-rays show bone-on-bone arthritis in both hips, with concentrically reduced joints.  Assessment: "   End-stage DJD, hips  Plan:       He is a good candidate for hip replacement.  Because of his insurance coverage, I recommended that he go ahead and keep the appointment that he has at [facility].    The patient's pathophysiology was explained in detail with reference to x-rays, models, other visual aids as appropriate.  Treatment options were discussed in detail.  Questions were invited and answered to the patient's satisfaction.    Sadiq Laureano MD  Orthopaedic Surgery

## 2024-02-26 ENCOUNTER — TELEPHONE (OUTPATIENT)
Dept: ORTHOPEDICS | Facility: CLINIC | Age: 49
End: 2024-02-26
Payer: MEDICAID

## 2024-02-26 NOTE — TELEPHONE ENCOUNTER
----- Message from Jered Reardon sent at 2/26/2024  2:14 PM CST -----  Regarding: call back  Pt's wife call to schedule appt requesting call back    Call

## 2024-02-29 ENCOUNTER — TELEPHONE (OUTPATIENT)
Dept: ORTHOPEDICS | Facility: CLINIC | Age: 49
End: 2024-02-29
Payer: MEDICAID

## 2024-02-29 NOTE — TELEPHONE ENCOUNTER
Spoke with the pt's wife and explained to her that is the soonest appt we have available. Pt's wife v/u

## 2024-02-29 NOTE — TELEPHONE ENCOUNTER
----- Message from Jeannie Julio sent at 2/29/2024  1:42 PM CST -----  Regarding: Sooner appointment  Contact: Bibiana/wife [phone]  Patient's wife Bibiana calling to schedule a sooner appointment than 03-27-24 due to right hip pain for patient. Please contact patient as soon as possible.

## 2024-03-27 ENCOUNTER — OFFICE VISIT (OUTPATIENT)
Dept: ORTHOPEDICS | Facility: CLINIC | Age: 49
End: 2024-03-27
Payer: MEDICAID

## 2024-03-27 VITALS
SYSTOLIC BLOOD PRESSURE: 111 MMHG | HEIGHT: 74 IN | BODY MASS INDEX: 39.01 KG/M2 | WEIGHT: 304 LBS | HEART RATE: 77 BPM | DIASTOLIC BLOOD PRESSURE: 72 MMHG

## 2024-03-27 DIAGNOSIS — M16.12 PRIMARY OSTEOARTHRITIS OF LEFT HIP: ICD-10-CM

## 2024-03-27 DIAGNOSIS — M16.11 PRIMARY OSTEOARTHRITIS OF RIGHT HIP: Primary | ICD-10-CM

## 2024-03-27 PROCEDURE — 99999 PR PBB SHADOW E&M-EST. PATIENT-LVL V: CPT | Mod: PBBFAC,,,

## 2024-03-27 PROCEDURE — 1160F RVW MEDS BY RX/DR IN RCRD: CPT | Mod: CPTII,,,

## 2024-03-27 PROCEDURE — 3008F BODY MASS INDEX DOCD: CPT | Mod: CPTII,,,

## 2024-03-27 PROCEDURE — 99214 OFFICE O/P EST MOD 30 MIN: CPT | Mod: S$PBB,,,

## 2024-03-27 PROCEDURE — 99215 OFFICE O/P EST HI 40 MIN: CPT | Mod: PBBFAC,PN

## 2024-03-27 PROCEDURE — 3074F SYST BP LT 130 MM HG: CPT | Mod: CPTII,,,

## 2024-03-27 PROCEDURE — 3078F DIAST BP <80 MM HG: CPT | Mod: CPTII,,,

## 2024-03-27 PROCEDURE — 1159F MED LIST DOCD IN RCRD: CPT | Mod: CPTII,,,

## 2024-03-27 PROCEDURE — 4010F ACE/ARB THERAPY RXD/TAKEN: CPT | Mod: CPTII,,,

## 2024-03-27 NOTE — PROGRESS NOTES
Patient ID: Van Fonseca is a 48 y.o. male    Pain of the Right Hip    History of Present Illness:    Van Fonseca c ICD presents to clinic for bilateral hip pain, R > L. Patient denies known FIDENCIO. The pain started years ago and is becoming progressively worse.  Pain is located over (points to)  bilateral hip. He reports that the pain is a 7 /10 sore, aching, and intermittent pain toda. The pain is affecting ADLs and limiting desired level of activity. Denies numbness, tingling, radiation . Moderate pain to bear weight. Pain is 10 /10 at its worst.     Trial of norco with mild improvement. Denies PT, injections or surgery.     PCP: Dr. Jacob, he sees Dr. Triplett with cardiology     Occupation: disabled     Ambulating: cane for long distances  Diabetic: no  Smoking: current  Hx of DVT/PE: no    PAST MEDICAL HISTORY: History reviewed. No pertinent past medical history.  PAST SURGICAL HISTORY:   Past Surgical History:   Procedure Laterality Date    TONSILLECTOMY       FAMILY HISTORY: History reviewed. No pertinent family history.  SOCIAL HISTORY:   Social History     Occupational History    Not on file   Tobacco Use    Smoking status: Some Days     Types: Cigars    Smokeless tobacco: Never   Substance and Sexual Activity    Alcohol use: Yes     Comment: 2- 3 x a week    Drug use: No    Sexual activity: Not on file        MEDICATIONS:   Current Outpatient Medications:     aspirin 81 MG Chew, Take 81 mg by mouth., Disp: , Rfl:     atorvastatin (LIPITOR) 80 MG tablet, Take 80 mg by mouth once daily., Disp: , Rfl:     carvediloL (COREG) 12.5 MG tablet, Take 12.5 mg by mouth 2 (two) times daily., Disp: , Rfl:     clopidogreL (PLAVIX) 75 mg tablet, Take 75 mg by mouth once daily., Disp: , Rfl:     diclofenac sodium (VOLTAREN) 1 % Gel, SMARTSIG:Gram(s) Topical 3 Times Daily, Disp: , Rfl:     FARXIGA 10 mg tablet, Take 10 mg by mouth once daily., Disp: , Rfl:     furosemide (LASIX) 20 MG tablet, Take 20 mg by mouth., Disp: ,  Rfl:     ibuprofen (ADVIL,MOTRIN) 600 MG tablet, Take 1 tablet (600 mg total) by mouth every 6 (six) hours as needed for Pain., Disp: 20 tablet, Rfl: 0    lisinopriL (PRINIVIL,ZESTRIL) 2.5 MG tablet, Take 2.5 mg by mouth 2 (two) times a day., Disp: , Rfl:     meloxicam (MOBIC) 7.5 MG tablet, Take 7.5 mg by mouth 2 (two) times daily., Disp: , Rfl:     metFORMIN (GLUCOPHAGE-XR) 750 MG ER 24hr tablet, SMARTSI Tablet(s) By Mouth Every Evening, Disp: , Rfl:     nitroGLYCERIN (NITROSTAT) 0.4 MG SL tablet, Sit down. Place 1 tab under the tongue every 5 minutes for chest pain. Max 3 tabs in 15 minutes. Call 911 for unrelieved chest pain., Disp: , Rfl:     rosuvastatin (CRESTOR) 40 MG Tab, Take 40 mg by mouth once daily., Disp: , Rfl:     sildenafil (REVATIO) 20 mg Tab, Take 20 mg by mouth., Disp: , Rfl:     sildenafiL (VIAGRA) 100 MG tablet, Take 100 mg by mouth as needed., Disp: , Rfl:     traMADoL (ULTRAM) 50 mg tablet, Take 1 tablet (50 mg total) by mouth every 6 (six) hours as needed for Pain., Disp: 12 tablet, Rfl: 0    TRULICITY 0.75 mg/0.5 mL pen injector, Inject 0.75 mg into the skin every 7 days., Disp: , Rfl:     ZTLIDO 1.8 % PtMd, Apply topically., Disp: , Rfl:   ALLERGIES: Review of patient's allergies indicates:  No Known Allergies      Physical Exam     Vitals:    24 1046   BP: 111/72   Pulse: 77     Alert and oriented to person, place and time. No acute distress. Well-groomed, not ill appearing. Pupils round and reactive, normal respiratory effort, no audible wheezing.     Gait: He  walks with an antalgic gait.                   EXTREMITIES:  Examination of lower extremities reveals there is no visible mass or deformity.    Left hip:  ROM (IR/ER) 5/5    + FADIR test    + Stinchfield test     .Negative trochanteric pain.    .Positive straight leg raise.    No warmth.    No erythema        Right hip:  ROM(IR/ER) 5/5    + FADIR test    + Stinchfield test     Negative trochanteric pain.    Positive  straight leg raise.    No warmth    No erythema        The skin over both lower extremities is normal and unremarkable.  He has a  painless range of motion of the knees and ankles bilaterally.   Sensation is intact in both lower extremities.    There are no motor deficits in the lower extremities bilaterally.   Pedal pulses are palpable distally bilaterally.    He has no calf tenderness to palpation nor edema.      Imaging:     Bilateral hip x-rays reviewed by me which show no evidence of acute fracture or dislocation.  There is severe arthritis to bilateral hips with femoral head remodeling.      Assessment & Plan    Primary osteoarthritis of right hip    Primary osteoarthritis of left hip         I made the decision to obtain old records of the patient including previous notes and imaging. New imaging was ordered today of the extremity or extremities evaluated. I independently reviewed and interpreted the radiographs and/or MRIs/CT scan today as well as prior imaging.    We discussed at length different treatment options including conservative vs surgical management. These include anti-inflammatories, acetaminophen, rest, ice, heat, formal physical therapy including strengthening and stretching exercises, home exercise programs, injections, dry needling, and finally surgical intervention.      Patient here for chronic bilateral hip pain.  He has severe primary osteoarthritis of bilateral hips.  His right hip is more bothersome.  I do not believe this will be improved with conservative treatment and he will require total hip arthroplasty.    Patient here today for right hip pain. he is interested in right IFRAH. he has failed conservative treatment and would like to proceed with surgery to minimize pain and increase functionality.   Will get patient set up for joint boot camp. Instructed this must be completed prior to scheduling. April appt scheduled.  Instructed patient to schedule for PCP clearance, must be  obtained prior to scheduling  Continue activity as tolerated  Recommended smoking cessation and weight loss, if applicable     [facility]  X-rays next visit: bilateral hip    All questions were answered and patient is agreeable to the above plan.

## 2024-04-24 ENCOUNTER — OFFICE VISIT (OUTPATIENT)
Dept: ORTHOPEDICS | Facility: CLINIC | Age: 49
End: 2024-04-24
Payer: MEDICAID

## 2024-04-24 VITALS
HEIGHT: 74 IN | BODY MASS INDEX: 33.34 KG/M2 | WEIGHT: 259.81 LBS | SYSTOLIC BLOOD PRESSURE: 114 MMHG | DIASTOLIC BLOOD PRESSURE: 63 MMHG | OXYGEN SATURATION: 97 % | HEART RATE: 88 BPM

## 2024-04-24 DIAGNOSIS — M16.11 PRIMARY OSTEOARTHRITIS OF RIGHT HIP: ICD-10-CM

## 2024-04-24 DIAGNOSIS — M16.12 PRIMARY OSTEOARTHRITIS OF LEFT HIP: Primary | ICD-10-CM

## 2024-04-24 PROCEDURE — 3074F SYST BP LT 130 MM HG: CPT | Mod: CPTII,,, | Performed by: ORTHOPAEDIC SURGERY

## 2024-04-24 PROCEDURE — 4010F ACE/ARB THERAPY RXD/TAKEN: CPT | Mod: CPTII,,, | Performed by: ORTHOPAEDIC SURGERY

## 2024-04-24 PROCEDURE — 1159F MED LIST DOCD IN RCRD: CPT | Mod: CPTII,,, | Performed by: ORTHOPAEDIC SURGERY

## 2024-04-24 PROCEDURE — 99999 PR PBB SHADOW E&M-EST. PATIENT-LVL III: CPT | Mod: PBBFAC,,, | Performed by: ORTHOPAEDIC SURGERY

## 2024-04-24 PROCEDURE — 99213 OFFICE O/P EST LOW 20 MIN: CPT | Mod: PBBFAC,PN | Performed by: ORTHOPAEDIC SURGERY

## 2024-04-24 PROCEDURE — 99215 OFFICE O/P EST HI 40 MIN: CPT | Mod: S$PBB,,, | Performed by: ORTHOPAEDIC SURGERY

## 2024-04-24 PROCEDURE — 3008F BODY MASS INDEX DOCD: CPT | Mod: CPTII,,, | Performed by: ORTHOPAEDIC SURGERY

## 2024-04-24 PROCEDURE — 3078F DIAST BP <80 MM HG: CPT | Mod: CPTII,,, | Performed by: ORTHOPAEDIC SURGERY

## 2024-04-24 RX ORDER — FENTANYL CITRATE 50 UG/ML
25 INJECTION, SOLUTION INTRAMUSCULAR; INTRAVENOUS EVERY 5 MIN PRN
Status: CANCELLED | OUTPATIENT
Start: 2024-04-24

## 2024-04-24 RX ORDER — PREGABALIN 75 MG/1
75 CAPSULE ORAL NIGHTLY
Status: CANCELLED | OUTPATIENT
Start: 2024-04-24

## 2024-04-24 RX ORDER — LIDOCAINE HYDROCHLORIDE 10 MG/ML
1 INJECTION, SOLUTION EPIDURAL; INFILTRATION; INTRACAUDAL; PERINEURAL
Status: CANCELLED | OUTPATIENT
Start: 2024-04-24

## 2024-04-24 RX ORDER — MUPIROCIN 20 MG/G
1 OINTMENT TOPICAL
Status: CANCELLED | OUTPATIENT
Start: 2024-04-24

## 2024-04-24 RX ORDER — ACETAMINOPHEN 500 MG
1000 TABLET ORAL EVERY 6 HOURS
Status: CANCELLED | OUTPATIENT
Start: 2024-04-24

## 2024-04-24 RX ORDER — CELECOXIB 200 MG/1
200 CAPSULE ORAL DAILY
OUTPATIENT
Start: 2024-04-25

## 2024-04-24 RX ORDER — POLYETHYLENE GLYCOL 3350 17 G/17G
17 POWDER, FOR SOLUTION ORAL DAILY
OUTPATIENT
Start: 2024-04-25

## 2024-04-24 RX ORDER — NALOXONE HCL 0.4 MG/ML
0.02 VIAL (ML) INJECTION
Status: CANCELLED | OUTPATIENT
Start: 2024-04-24

## 2024-04-24 RX ORDER — CEFAZOLIN SODIUM 2 G/50ML
2 SOLUTION INTRAVENOUS
Status: CANCELLED | OUTPATIENT
Start: 2024-04-24

## 2024-04-24 RX ORDER — ASPIRIN 81 MG/1
81 TABLET ORAL 2 TIMES DAILY
Status: CANCELLED | OUTPATIENT
Start: 2024-04-24

## 2024-04-24 RX ORDER — FAMOTIDINE 20 MG/1
20 TABLET, FILM COATED ORAL 2 TIMES DAILY
Status: CANCELLED | OUTPATIENT
Start: 2024-04-24

## 2024-04-24 RX ORDER — SACUBITRIL AND VALSARTAN 24; 26 MG/1; MG/1
1 TABLET, FILM COATED ORAL 2 TIMES DAILY
COMMUNITY

## 2024-04-24 RX ORDER — OXYCODONE HYDROCHLORIDE 5 MG/1
5 TABLET ORAL
Status: CANCELLED | OUTPATIENT
Start: 2024-04-24

## 2024-04-24 RX ORDER — SODIUM CHLORIDE 9 MG/ML
INJECTION, SOLUTION INTRAVENOUS
Status: CANCELLED | OUTPATIENT
Start: 2024-04-24

## 2024-04-24 RX ORDER — FENTANYL CITRATE 50 UG/ML
50 INJECTION, SOLUTION INTRAMUSCULAR; INTRAVENOUS
Status: CANCELLED | OUTPATIENT
Start: 2024-04-24 | End: 2024-04-25

## 2024-04-24 RX ORDER — MORPHINE SULFATE 2 MG/ML
2 INJECTION, SOLUTION INTRAMUSCULAR; INTRAVENOUS
Status: CANCELLED | OUTPATIENT
Start: 2024-04-24

## 2024-04-24 RX ORDER — METHOCARBAMOL 750 MG/1
750 TABLET, FILM COATED ORAL 3 TIMES DAILY
OUTPATIENT
Start: 2024-04-24

## 2024-04-24 RX ORDER — MIDAZOLAM HYDROCHLORIDE 1 MG/ML
1 INJECTION, SOLUTION INTRAMUSCULAR; INTRAVENOUS
Status: CANCELLED | OUTPATIENT
Start: 2024-04-24 | End: 2024-04-25

## 2024-04-24 RX ORDER — SODIUM CHLORIDE 9 MG/ML
INJECTION, SOLUTION INTRAVENOUS CONTINUOUS
Status: CANCELLED | OUTPATIENT
Start: 2024-04-24 | End: 2024-04-25

## 2024-04-24 RX ORDER — POLYETHYLENE GLYCOL 3350 17 G/17G
17 POWDER, FOR SOLUTION ORAL DAILY
Status: CANCELLED | OUTPATIENT
Start: 2024-04-25

## 2024-04-24 RX ORDER — AMOXICILLIN 250 MG
1 CAPSULE ORAL 2 TIMES DAILY
Status: CANCELLED | OUTPATIENT
Start: 2024-04-24

## 2024-04-24 RX ORDER — LOPERAMIDE HYDROCHLORIDE 2 MG/1
4 CAPSULE ORAL ONCE
Status: CANCELLED | OUTPATIENT
Start: 2024-04-24 | End: 2024-04-24

## 2024-04-24 RX ORDER — CEFAZOLIN SODIUM 2 G/50ML
2 SOLUTION INTRAVENOUS
Status: CANCELLED | OUTPATIENT
Start: 2024-04-24 | End: 2024-04-25

## 2024-04-24 RX ORDER — PROCHLORPERAZINE EDISYLATE 5 MG/ML
5 INJECTION INTRAMUSCULAR; INTRAVENOUS EVERY 6 HOURS PRN
Status: CANCELLED | OUTPATIENT
Start: 2024-04-24

## 2024-04-24 RX ORDER — ZOLPIDEM TARTRATE 5 MG/1
5 TABLET ORAL NIGHTLY PRN
Status: CANCELLED | OUTPATIENT
Start: 2024-04-24

## 2024-04-24 RX ORDER — ONDANSETRON HYDROCHLORIDE 2 MG/ML
4 INJECTION, SOLUTION INTRAVENOUS EVERY 8 HOURS PRN
Status: CANCELLED | OUTPATIENT
Start: 2024-04-24

## 2024-04-24 RX ORDER — MUPIROCIN 20 MG/G
1 OINTMENT TOPICAL 2 TIMES DAILY
OUTPATIENT
Start: 2024-04-24 | End: 2024-04-29

## 2024-04-24 RX ORDER — OXYCODONE HYDROCHLORIDE 10 MG/1
10 TABLET ORAL
Status: CANCELLED | OUTPATIENT
Start: 2024-04-24

## 2024-04-24 NOTE — PROGRESS NOTES
Patient ID: Van Fonseca is a 48 y.o. male    No chief complaint on file.    History of Present Illness:    Van Fonseca c ICD presents to clinic for bilateral hip pain, R > L. Patient denies known FIDENCIO. The pain started years ago and is becoming progressively worse.  Pain is located over (points to)  bilateral hip. He reports that the pain is a 7 /10 sore, aching, and intermittent pain toda. The pain is affecting ADLs and limiting desired level of activity. Denies numbness, tingling, radiation . Moderate pain to bear weight. Pain is 10 /10 at its worst.     Trial of norco with mild improvement. Denies PT, injections or surgery.     PCP: Dr. Jacob, he sees Dr. Triplett with cardiology     Occupation: disabled     Ambulating: cane for long distances  Diabetic: no  Smoking: current  Hx of DVT/PE: no    ____________________________________________________________________    Interval history 04/24/2024 : Patient returns today for preoperative appointment for his right total hip replacement.  Has had cardiac clearance.  Moderate risk.  Also attempted boot camp.    PAST MEDICAL HISTORY: No past medical history on file.  PAST SURGICAL HISTORY:   Past Surgical History:   Procedure Laterality Date    TONSILLECTOMY       FAMILY HISTORY: No family history on file.  SOCIAL HISTORY:   Social History     Occupational History    Not on file   Tobacco Use    Smoking status: Some Days     Types: Cigars    Smokeless tobacco: Never   Substance and Sexual Activity    Alcohol use: Yes     Comment: 2- 3 x a week    Drug use: No    Sexual activity: Not on file        MEDICATIONS:   Current Outpatient Medications:     aspirin 81 MG Chew, Take 81 mg by mouth., Disp: , Rfl:     atorvastatin (LIPITOR) 80 MG tablet, Take 80 mg by mouth once daily., Disp: , Rfl:     carvediloL (COREG) 12.5 MG tablet, Take 12.5 mg by mouth 2 (two) times daily., Disp: , Rfl:     clopidogreL (PLAVIX) 75 mg tablet, Take 75 mg by mouth once daily., Disp: , Rfl:      FARXIGA 10 mg tablet, Take 10 mg by mouth once daily., Disp: , Rfl:     ibuprofen (ADVIL,MOTRIN) 600 MG tablet, Take 1 tablet (600 mg total) by mouth every 6 (six) hours as needed for Pain., Disp: 20 tablet, Rfl: 0    nitroGLYCERIN (NITROSTAT) 0.4 MG SL tablet, Sit down. Place 1 tab under the tongue every 5 minutes for chest pain. Max 3 tabs in 15 minutes. Call 911 for unrelieved chest pain., Disp: , Rfl:     TRULICITY 0.75 mg/0.5 mL pen injector, Inject 0.75 mg into the skin every 7 days., Disp: , Rfl:     diclofenac sodium (VOLTAREN) 1 % Gel, SMARTSIG:Gram(s) Topical 3 Times Daily, Disp: , Rfl:     ENTRESTO 24-26 mg per tablet, Take 1 tablet by mouth 2 (two) times daily., Disp: , Rfl:     furosemide (LASIX) 20 MG tablet, Take 20 mg by mouth. (Patient not taking: Reported on 2024), Disp: , Rfl:     meloxicam (MOBIC) 7.5 MG tablet, Take 7.5 mg by mouth 2 (two) times daily. (Patient not taking: Reported on 2024), Disp: , Rfl:     metFORMIN (GLUCOPHAGE-XR) 750 MG ER 24hr tablet, SMARTSI Tablet(s) By Mouth Every Evening (Patient not taking: Reported on 2024), Disp: , Rfl:     rosuvastatin (CRESTOR) 40 MG Tab, Take 40 mg by mouth once daily. (Patient not taking: Reported on 2024), Disp: , Rfl:     sildenafil (REVATIO) 20 mg Tab, Take 20 mg by mouth., Disp: , Rfl:     sildenafiL (VIAGRA) 100 MG tablet, Take 100 mg by mouth as needed., Disp: , Rfl:     traMADoL (ULTRAM) 50 mg tablet, Take 1 tablet (50 mg total) by mouth every 6 (six) hours as needed for Pain. (Patient not taking: Reported on 2024), Disp: 12 tablet, Rfl: 0    ZTLIDO 1.8 % PtMd, Apply topically., Disp: , Rfl:   ALLERGIES: Review of patient's allergies indicates:  No Known Allergies      Physical Exam     Vitals:    24 1346   BP: 114/63   Pulse: 88     Alert and oriented to person, place and time. No acute distress. Well-groomed, not ill appearing. Pupils round and reactive, normal respiratory effort, no audible wheezing.      Gait: He  walks with an antalgic gait.                   EXTREMITIES:  Examination of lower extremities reveals there is no visible mass or deformity.    Left hip:  ROM (IR/ER) 5/5    + FADIR test    + Stinchfield test     .Negative trochanteric pain.    .Positive straight leg raise.    No warmth.    No erythema        Right hip:  ROM(IR/ER) 5/5    + FADIR test    + Stinchfield test     Negative trochanteric pain.    Positive straight leg raise.    No warmth    No erythema        The skin over both lower extremities is normal and unremarkable.  He has a  painless range of motion of the knees and ankles bilaterally.   Sensation is intact in both lower extremities.    There are no motor deficits in the lower extremities bilaterally.   Pedal pulses are palpable distally bilaterally.    He has no calf tenderness to palpation nor edema.      Imaging:     Bilateral hip x-rays reviewed by me which show no evidence of acute fracture or dislocation.  There is severe arthritis to bilateral hips with femoral head remodeling.      Assessment & Plan    Primary osteoarthritis of left hip    Primary osteoarthritis of right hip      Van Fonseca is a 48 y.o. male who has radiographic and clinical evidence of bilateral hip DJD.     At this point the patient has failed extensive non-operative and conservative treatment with physician guided home exercise program, injections, weight loss/activity modification and NSAIDs/OTC medications. We discussed multiple options including non-operative and operative treatments. Non-operatively we discussed continuation of injections, visco supplementation, HEP and formal PT.  Operatively we discussed total hip replacement. We discussed the pros and cons of surgery in detail as well as the risks and benefits of surgery, time required for recovery, need for physical therapy post-operatively and expectations long term. We discussed specifically the risks of loosening, instability, dislocation,  superficial and deep periprosthetic joint infection, arthrofibrosis and DVT/PE. After extensive discussion with the patient and shared decision making, they have elected to undergo joint replacement to improve function and pain.     _________________________________________________________________      Van Fonseca will be scheduled for total joint arthroplasty of the right Hip      Post-Op Medications to be prescribed:   Percocet 5/325mg Take 1-2 tablets every 4-6 hours PRN pain #28  Zofran 4mg oral disintegrating tablets every 8 hours PRN nausea/vomiting   ASA 81mg BID x 4 weeks  Motrin 600 mg TID PRN     The mobility limitations from surgery cannot be sufficiently resolved by the use of a cane. Patient' functional mobility deficit can be sufficiently resolved with the use of a rolling walker or walker. Patient mobility limitation significantly impairs their ability to participate in one or more activities of daily living. The use of a walker or rolling walker will significantly improve the patient ability to participate in MRADLS and the patient will use it on a regular basis in the home.      Medical Clearance:  Obtained, moderate risk  Hx of DVT,PE, anesthetic complications:  None  Pre-operative Ancef and Tranexamic Acid  Joint BootCamp completed    Additional notes/concerns:  Patient has cardiac stents.  We will need to stop Plavix 5 days prior.  Okay to resume on postoperative day 5.  Okay to resume aspirin postoperative day 1

## 2024-05-21 ENCOUNTER — TELEPHONE (OUTPATIENT)
Dept: ORTHOPEDICS | Facility: CLINIC | Age: 49
End: 2024-05-21
Payer: MEDICAID

## 2024-05-21 NOTE — TELEPHONE ENCOUNTER
----- Message from Francesca Johnson LPN sent at 5/21/2024  4:25 PM CDT -----  Regarding: FW: pt advice  Contact: [phone]    ----- Message -----  From: Lia King  Sent: 5/21/2024   1:31 PM CDT  To: Janet Zhang Staff  Subject: pt advice                                        Pt calling in regards to procedure schedule 6/24/24, pt needing to see if surgery could be schdule sooner than what's  scheduled. Pls nehemiah

## 2024-05-22 NOTE — TELEPHONE ENCOUNTER
Spoke with wife, they are in the process of moving and inquired about moving up surgery or moving back surgery.  June 17th of July 1st and July 8th dates were given to wife.  She will discussed with her  and call back.  All questions answered.

## 2024-05-30 ENCOUNTER — TELEPHONE (OUTPATIENT)
Dept: ORTHOPEDICS | Facility: CLINIC | Age: 49
End: 2024-05-30
Payer: MEDICAID

## 2024-05-30 NOTE — TELEPHONE ENCOUNTER
----- Message from Francesca Johnson LPN sent at 5/30/2024  1:38 PM CDT -----    ----- Message -----  From: Anali Yañez  Sent: 5/29/2024   4:37 PM CDT  To: Janet Zhang Staff    Van Raymundo  wife Bibiana calling regarding Appointment Access for needing to get the PT rescheduled for the surgery set for 06/24/24, call back to assist with this request,  [phone]

## 2024-05-31 ENCOUNTER — TELEPHONE (OUTPATIENT)
Dept: ORTHOPEDICS | Facility: CLINIC | Age: 49
End: 2024-05-31
Payer: MEDICAID

## 2024-05-31 NOTE — TELEPHONE ENCOUNTER
----- Message from Andreia Montano LPN sent at 5/31/2024 10:23 AM CDT -----  Regarding: RE: new surgery date  July 2nd at 2:30pm  ----- Message -----  From: Tiara Gil LPN  Sent: 5/31/2024   9:20 AM CDT  To: Andreia Montano LPN  Subject: FW: new surgery date                             Good morning!    I tried to move appt up. I cannot. Please help  ----- Message -----  From: Gretta Zhang PA-C  Sent: 5/31/2024   9:13 AM CDT  To: Vicente Herman  Subject: new surgery date                                 His new surgery date is 6/17 can we move his post op a week up?

## 2024-06-05 ENCOUNTER — TELEPHONE (OUTPATIENT)
Dept: ORTHOPEDICS | Facility: CLINIC | Age: 49
End: 2024-06-05
Payer: MEDICAID

## 2024-06-05 NOTE — TELEPHONE ENCOUNTER
Spoke c wife. Instructed to stop plavix and asa on 6/12, 5 days before IFRAH. He will resume aspirin on 6/18, POD1 and plavix on 6/21, POD5. Wife v/u.

## 2024-06-06 DIAGNOSIS — M16.12 PRIMARY OSTEOARTHRITIS OF LEFT HIP: Primary | ICD-10-CM

## 2024-06-06 DIAGNOSIS — M16.11 PRIMARY OSTEOARTHRITIS OF RIGHT HIP: ICD-10-CM

## 2024-06-13 DIAGNOSIS — M16.11 PRIMARY OSTEOARTHRITIS OF RIGHT HIP: Primary | ICD-10-CM

## 2024-06-17 DIAGNOSIS — G89.18 ACUTE POST-OPERATIVE PAIN: Primary | ICD-10-CM

## 2024-06-17 RX ORDER — ONDANSETRON 4 MG/1
4 TABLET, ORALLY DISINTEGRATING ORAL 2 TIMES DAILY
Qty: 30 TABLET | Refills: 0 | Status: SHIPPED | OUTPATIENT
Start: 2024-06-17

## 2024-06-17 RX ORDER — IBUPROFEN 600 MG/1
600 TABLET ORAL 3 TIMES DAILY
Qty: 60 TABLET | Refills: 1 | Status: SHIPPED | OUTPATIENT
Start: 2024-06-17

## 2024-06-17 RX ORDER — DOXYCYCLINE 100 MG/1
100 CAPSULE ORAL 2 TIMES DAILY
Qty: 14 CAPSULE | Refills: 0 | Status: SHIPPED | OUTPATIENT
Start: 2024-06-17

## 2024-06-17 RX ORDER — ASPIRIN 81 MG/1
81 TABLET ORAL 2 TIMES DAILY
Qty: 60 TABLET | Refills: 0 | Status: SHIPPED | OUTPATIENT
Start: 2024-06-17 | End: 2025-06-17

## 2024-06-17 RX ORDER — OXYCODONE AND ACETAMINOPHEN 5; 325 MG/1; MG/1
1 TABLET ORAL EVERY 4 HOURS PRN
Qty: 28 EACH | Refills: 0 | Status: SHIPPED | OUTPATIENT
Start: 2024-06-17 | End: 2024-06-20 | Stop reason: SDUPTHER

## 2024-06-18 PROCEDURE — G0180 MD CERTIFICATION HHA PATIENT: HCPCS | Mod: ,,, | Performed by: ORTHOPAEDIC SURGERY

## 2024-06-20 ENCOUNTER — TELEPHONE (OUTPATIENT)
Dept: ORTHOPEDICS | Facility: CLINIC | Age: 49
End: 2024-06-20
Payer: MEDICAID

## 2024-06-20 DIAGNOSIS — G89.18 ACUTE POST-OPERATIVE PAIN: ICD-10-CM

## 2024-06-20 NOTE — TELEPHONE ENCOUNTER
Message sent to wrong staff. Forward message to Dr Gonzales staff    ----- Message from Deana Malcolm sent at 6/20/2024  2:59 PM CDT -----  Regarding: refill- pain med post surgery  Contact: [phone]  Van Fonseca calling regarding Refills  (message) for oxyCODONE-acetaminophen (PERCOCET) 5-325 mg per tablet.  He stated he is not out but he will be out by Saturday morning.  He stated his pharmacy is closed on the weekend.        [facility] [place] [address] [facility].  [address] [facility].  [place] [address]  Phone: [phone] Fax: [phone]

## 2024-06-20 NOTE — TELEPHONE ENCOUNTER
----- Message from Karla Lo MA sent at 6/20/2024  3:14 PM CDT -----  Regarding: FW: refill- pain med post surgery  Contact: [phone]    ----- Message -----  From: Deana Malcolm  Sent: 6/20/2024   3:02 PM CDT  To: Sridevi HUMPHREYS Staff  Subject: refill- pain med post surgery                    Van Fonseca calling regarding Refills  (message) for oxyCODONE-acetaminophen (PERCOCET) 5-325 mg per tablet.  He stated he is not out but he will be out by Saturday morning.  He stated his pharmacy is closed on the weekend.        [facility] - [place] - [address]  [address] [place] [address]  Phone: [phone] Fax: [phone]

## 2024-06-21 RX ORDER — OXYCODONE AND ACETAMINOPHEN 5; 325 MG/1; MG/1
1 TABLET ORAL EVERY 4 HOURS PRN
Qty: 28 EACH | Refills: 0 | Status: SHIPPED | OUTPATIENT
Start: 2024-06-21

## 2024-06-24 DIAGNOSIS — G89.18 ACUTE POST-OPERATIVE PAIN: ICD-10-CM

## 2024-06-24 NOTE — TELEPHONE ENCOUNTER
----- Message from Anali Yañez sent at 6/24/2024  1:11 PM CDT -----  EDEN Mccarty calling regarding Refills for oxyCODONE-acetaminophen (PERCOCET) 5-325 mg per tablet      [facility] [place] [address]  [facility] [email or web address] [address]  Phone: [phone] Fax: [phone]

## 2024-06-25 RX ORDER — OXYCODONE AND ACETAMINOPHEN 5; 325 MG/1; MG/1
1 TABLET ORAL EVERY 4 HOURS PRN
Qty: 28 EACH | Refills: 0 | Status: SHIPPED | OUTPATIENT
Start: 2024-06-25

## 2024-07-01 DIAGNOSIS — Z96.642 S/P TOTAL LEFT HIP ARTHROPLASTY: Primary | ICD-10-CM

## 2024-07-02 ENCOUNTER — OFFICE VISIT (OUTPATIENT)
Dept: ORTHOPEDICS | Facility: CLINIC | Age: 49
End: 2024-07-02
Payer: MEDICAID

## 2024-07-02 VITALS
HEIGHT: 74 IN | BODY MASS INDEX: 33.33 KG/M2 | HEART RATE: 88 BPM | SYSTOLIC BLOOD PRESSURE: 101 MMHG | DIASTOLIC BLOOD PRESSURE: 64 MMHG | WEIGHT: 259.69 LBS

## 2024-07-02 DIAGNOSIS — G89.18 ACUTE POST-OPERATIVE PAIN: ICD-10-CM

## 2024-07-02 DIAGNOSIS — Z96.642 S/P TOTAL LEFT HIP ARTHROPLASTY: Primary | ICD-10-CM

## 2024-07-02 PROCEDURE — 3078F DIAST BP <80 MM HG: CPT | Mod: CPTII,,,

## 2024-07-02 PROCEDURE — 4010F ACE/ARB THERAPY RXD/TAKEN: CPT | Mod: CPTII,,,

## 2024-07-02 PROCEDURE — 99024 POSTOP FOLLOW-UP VISIT: CPT | Mod: ,,,

## 2024-07-02 PROCEDURE — 1159F MED LIST DOCD IN RCRD: CPT | Mod: CPTII,,,

## 2024-07-02 PROCEDURE — 99213 OFFICE O/P EST LOW 20 MIN: CPT | Mod: PBBFAC,PN

## 2024-07-02 PROCEDURE — 3074F SYST BP LT 130 MM HG: CPT | Mod: CPTII,,,

## 2024-07-02 PROCEDURE — 99999 PR PBB SHADOW E&M-EST. PATIENT-LVL III: CPT | Mod: PBBFAC,,,

## 2024-07-02 RX ORDER — OXYCODONE AND ACETAMINOPHEN 5; 325 MG/1; MG/1
1 TABLET ORAL EVERY 4 HOURS PRN
Qty: 28 EACH | Refills: 0 | Status: SHIPPED | OUTPATIENT
Start: 2024-07-02

## 2024-07-02 NOTE — PROGRESS NOTES
Post-op Note    HPI    Van Fonseca is here 2 weeks s/p the following procedure:     6/17/2024  Right IFRAH    Overall doing well. Pain controlled on current regimen. Started outpatient PT today, was getting home health. Denies any chest pain or shortness of breathe. Denies any drainage from the incision. Denies any fevers, chills or paresthesias.  Completed doxycycline.  Does feel like his left leg is longer than his right.    DVT Prophylaxis:  ASA b.i.d. x4 weeks, resumed Plavix      Physical Exam:     Patient is alert and oriented no acute distress.   Assistive Device:  Wheelchair    Right hip: aquavel removed. Incision(s) are well healed.  There is no evidence of dehiscence.  There is no induration erythema or signs of infection.  Appropriate soft tissue swelling.  Compartments are soft and compressible.  Warm well-perfused extremity.    Imaging:     I have personally reviewed the following imaging and these are an interpretation of my findings:     X-Ray: I have reviewed all pertinent results/findings and my personal findings are:  Status post right IFRAH in good alignment without evidence of hardware failure or complication, severe end-stage left DJD    Assessment    Van Fonseca is 2 weeks Post-op     Plan:    Overall doing as expected.  We discussed expectations of surgery and postoperative course.  Discussed he also has severe left hip arthritis.     Pain: Continued postoperative pain regimen -- percocet refill sent  DVT prophylaxis:  Continue  PT/OT: Continue/Initiate physical therapy (weight bearing status:  Weight-bearing as tolerated), posterior precautions    In 24 hours, you may shower without covering your incision.  Do not submerge your incision for another 2 weeks.  If steri strips applied, they can get wet, remove in 48-72 hours if not falling off on their own. Do not submerge incision for another 2 weeks. You may start to do a scar massage with vitamin-E oil/cocoa butter to your incisions. Please inform  the clinic if you experience any bleeding or discharge, warmth, or redness.        Follow-up: 4 weeks   X-rays next visit: 1 view right hip

## 2024-07-15 ENCOUNTER — TELEPHONE (OUTPATIENT)
Dept: ORTHOPEDICS | Facility: CLINIC | Age: 49
End: 2024-07-15
Payer: MEDICAID

## 2024-07-15 ENCOUNTER — EXTERNAL HOME HEALTH (OUTPATIENT)
Dept: HOME HEALTH SERVICES | Facility: HOSPITAL | Age: 49
End: 2024-07-15
Payer: MEDICAID

## 2024-07-15 NOTE — TELEPHONE ENCOUNTER
"----- Message from Francesca Johnson LPN sent at 7/15/2024  1:32 PM CDT -----  Regarding: FW: pt advice  Contact: [phone]    ----- Message -----  From: Lia King  Sent: 7/15/2024  12:12 PM CDT  To: Janet Zhang Staff  Subject: pt advice                                        Name Of Caller: Knedia/ wife     Contact Preference?:     What is the nature of the call?: in reference to previous &  next surgery , pl call      Additional Notes:  "Thank you for all that you do for our patients"  "

## 2024-07-15 NOTE — TELEPHONE ENCOUNTER
Returned the pt's call and he want to speak with the Dr. 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 The pt only want to speak to the Dr. I told the pt that the Physician Assistant will call him tomorrow.

## 2024-07-16 ENCOUNTER — TELEPHONE (OUTPATIENT)
Dept: ORTHOPEDICS | Facility: CLINIC | Age: 49
End: 2024-07-16
Payer: MEDICAID

## 2024-07-16 NOTE — TELEPHONE ENCOUNTER
Spoke with patient.  States he was interested in getting his other hip done sooner rather than later.  Spoke with surgeon, may schedule 6 weeks at earliest.  Offered August 5th or 12th date patient.  Patient stated he will talk to his wife and call us back.

## 2024-07-18 ENCOUNTER — TELEPHONE (OUTPATIENT)
Dept: ORTHOPEDICS | Facility: CLINIC | Age: 49
End: 2024-07-18
Payer: MEDICAID

## 2024-07-18 DIAGNOSIS — M16.11 PRIMARY OSTEOARTHRITIS OF RIGHT HIP: Primary | ICD-10-CM

## 2024-07-18 DIAGNOSIS — G89.18 ACUTE POST-OPERATIVE PAIN: ICD-10-CM

## 2024-07-18 RX ORDER — IBUPROFEN 600 MG/1
600 TABLET ORAL 3 TIMES DAILY
Qty: 60 TABLET | Refills: 1 | Status: SHIPPED | OUTPATIENT
Start: 2024-07-18

## 2024-07-18 NOTE — TELEPHONE ENCOUNTER
Spoke with pt. Pt would like to schedule surgery for 8/26/24. Pt would also like a refill of ibuprofen. Advised pt a message would be sent to ONEL Hewitt. All questions answered. Pt verbalized understanding.

## 2024-07-18 NOTE — TELEPHONE ENCOUNTER
----- Message from Anali Yañez sent at 7/18/2024  7:54 AM CDT -----    Nature of Call: returned call about surgery options    Best Call Back Number: [phone]     Additional Information:  Van Fonseca calling regarding Patient Advice for wanting to speak to ONEL Glynn about the surgery dates she offered him, stating he would prefer late August if possible, please call back to inform if this is possible,     PT is also requesting a refill on the medication:   ibuprofen (ADVIL,MOTRIN) 600 MG tablet      [facility] - [place] - [address] [facility].  [address] [facility].  [place] [address]  Phone: [phone] Fax: [phone]

## 2024-07-18 NOTE — PROGRESS NOTES
Spoke with wife, right IFRAH added to the schedule for 8/26.  Refill ibuprofen sent.  Wife verbalized understanding and was grateful.

## 2024-07-30 DIAGNOSIS — Z96.642 S/P TOTAL LEFT HIP ARTHROPLASTY: Primary | ICD-10-CM

## 2024-08-05 ENCOUNTER — TELEPHONE (OUTPATIENT)
Dept: ORTHOPEDICS | Facility: CLINIC | Age: 49
End: 2024-08-05
Payer: MEDICAID

## 2024-08-16 ENCOUNTER — TELEPHONE (OUTPATIENT)
Dept: ORTHOPEDICS | Facility: CLINIC | Age: 49
End: 2024-08-16
Payer: MEDICAID

## 2024-08-19 DIAGNOSIS — M16.12 PRIMARY OSTEOARTHRITIS OF LEFT HIP: Primary | ICD-10-CM

## 2024-08-20 ENCOUNTER — TELEPHONE (OUTPATIENT)
Dept: ORTHOPEDICS | Facility: CLINIC | Age: 49
End: 2024-08-20
Payer: MEDICAID

## 2024-08-20 DIAGNOSIS — M16.12 PRIMARY OSTEOARTHRITIS OF LEFT HIP: Primary | ICD-10-CM

## 2024-08-20 NOTE — TELEPHONE ENCOUNTER
----- Message from Edvin Reyes sent at 8/20/2024 12:30 PM CDT -----  Regarding: orders  Name of Who is Calling:Pt         What is the request in detail: Requesting in home physical therapy orders  Please advise           Can the clinic reply by [facility]: no         What [facility]:Telephone Information:  Mobile          [phone]

## 2024-08-21 ENCOUNTER — TELEPHONE (OUTPATIENT)
Dept: ORTHOPEDICS | Facility: CLINIC | Age: 49
End: 2024-08-21
Payer: MEDICAID

## 2024-08-21 NOTE — TELEPHONE ENCOUNTER
"----- Message from Francesca Johnson LPN sent at 8/21/2024 10:03 AM CDT -----  Regarding: FW: pt advice  Contact: [phone]    ----- Message -----  From: Lia King  Sent: 8/21/2024   9:30 AM CDT  To: Janet Zhang Staff  Subject: pt advice                                        .Name Of Caller: Self     Contact Preference?: [phone]     What is the nature of the call?: pt has questions about procedure 8/23/24 pls nehemiah      Additional Notes:  "Thank you for all that you do for our patients"  "

## 2024-08-22 ENCOUNTER — TELEPHONE (OUTPATIENT)
Dept: ORTHOPEDICS | Facility: CLINIC | Age: 49
End: 2024-08-22
Payer: MEDICAID

## 2024-08-22 NOTE — TELEPHONE ENCOUNTER
Spoke with patient.  He needs to postpone his surgery as he was moving next week.  Offered September 30th date which patient accepted.  All questions answered.  Preop appointment will be on September 20th.

## 2024-09-20 ENCOUNTER — OFFICE VISIT (OUTPATIENT)
Dept: ORTHOPEDICS | Facility: CLINIC | Age: 49
End: 2024-09-20
Payer: COMMERCIAL

## 2024-09-20 VITALS
SYSTOLIC BLOOD PRESSURE: 114 MMHG | BODY MASS INDEX: 33.27 KG/M2 | WEIGHT: 259.25 LBS | DIASTOLIC BLOOD PRESSURE: 78 MMHG | HEART RATE: 76 BPM | HEIGHT: 74 IN

## 2024-09-20 DIAGNOSIS — M16.12 PRIMARY OSTEOARTHRITIS OF LEFT HIP: Primary | ICD-10-CM

## 2024-09-20 DIAGNOSIS — Z96.642 S/P TOTAL LEFT HIP ARTHROPLASTY: Primary | ICD-10-CM

## 2024-09-20 PROCEDURE — 99999 PR PBB SHADOW E&M-EST. PATIENT-LVL IV: CPT | Mod: PBBFAC,,,

## 2024-09-20 RX ORDER — FENTANYL CITRATE 50 UG/ML
50 INJECTION, SOLUTION INTRAMUSCULAR; INTRAVENOUS
OUTPATIENT
Start: 2024-09-20 | End: 2024-09-21

## 2024-09-20 RX ORDER — FAMOTIDINE 20 MG/1
20 TABLET, FILM COATED ORAL 2 TIMES DAILY
OUTPATIENT
Start: 2024-09-20

## 2024-09-20 RX ORDER — CEFAZOLIN SODIUM 2 G/50ML
2 SOLUTION INTRAVENOUS
OUTPATIENT
Start: 2024-09-20 | End: 2024-09-21

## 2024-09-20 RX ORDER — ACETAMINOPHEN 500 MG
1000 TABLET ORAL EVERY 6 HOURS
OUTPATIENT
Start: 2024-09-20

## 2024-09-20 RX ORDER — PREGABALIN 75 MG/1
75 CAPSULE ORAL NIGHTLY
OUTPATIENT
Start: 2024-09-20

## 2024-09-20 RX ORDER — METHOCARBAMOL 750 MG/1
750 TABLET, FILM COATED ORAL 3 TIMES DAILY
OUTPATIENT
Start: 2024-09-20

## 2024-09-20 RX ORDER — CELECOXIB 200 MG/1
200 CAPSULE ORAL DAILY
OUTPATIENT
Start: 2024-09-20

## 2024-09-20 RX ORDER — SODIUM CHLORIDE 9 MG/ML
INJECTION, SOLUTION INTRAVENOUS CONTINUOUS
OUTPATIENT
Start: 2024-09-20 | End: 2024-09-21

## 2024-09-20 RX ORDER — OXYCODONE HYDROCHLORIDE 5 MG/1
5 TABLET ORAL
OUTPATIENT
Start: 2024-09-20

## 2024-09-20 RX ORDER — ONDANSETRON HYDROCHLORIDE 2 MG/ML
4 INJECTION, SOLUTION INTRAVENOUS EVERY 8 HOURS PRN
OUTPATIENT
Start: 2024-09-20

## 2024-09-20 RX ORDER — ASPIRIN 81 MG/1
81 TABLET ORAL 2 TIMES DAILY
OUTPATIENT
Start: 2024-09-20

## 2024-09-20 RX ORDER — NALOXONE HCL 0.4 MG/ML
0.02 VIAL (ML) INJECTION
OUTPATIENT
Start: 2024-09-20

## 2024-09-20 RX ORDER — FENTANYL CITRATE 50 UG/ML
25 INJECTION, SOLUTION INTRAMUSCULAR; INTRAVENOUS EVERY 5 MIN PRN
OUTPATIENT
Start: 2024-09-20

## 2024-09-20 RX ORDER — SODIUM CHLORIDE 9 MG/ML
INJECTION, SOLUTION INTRAVENOUS
OUTPATIENT
Start: 2024-09-20

## 2024-09-20 RX ORDER — POLYETHYLENE GLYCOL 3350 17 G/17G
17 POWDER, FOR SOLUTION ORAL DAILY
OUTPATIENT
Start: 2024-09-20

## 2024-09-20 RX ORDER — OXYCODONE HYDROCHLORIDE 10 MG/1
10 TABLET ORAL
OUTPATIENT
Start: 2024-09-20

## 2024-09-20 RX ORDER — TALC
6 POWDER (GRAM) TOPICAL NIGHTLY PRN
OUTPATIENT
Start: 2024-09-20

## 2024-09-20 RX ORDER — CEFAZOLIN SODIUM 2 G/50ML
2 SOLUTION INTRAVENOUS
OUTPATIENT
Start: 2024-09-20

## 2024-09-20 RX ORDER — MORPHINE SULFATE 2 MG/ML
2 INJECTION, SOLUTION INTRAMUSCULAR; INTRAVENOUS
OUTPATIENT
Start: 2024-09-20

## 2024-09-20 RX ORDER — AMOXICILLIN 250 MG
1 CAPSULE ORAL 2 TIMES DAILY
OUTPATIENT
Start: 2024-09-20

## 2024-09-20 RX ORDER — MIDAZOLAM HYDROCHLORIDE 1 MG/ML
1 INJECTION, SOLUTION INTRAMUSCULAR; INTRAVENOUS
OUTPATIENT
Start: 2024-09-20 | End: 2024-09-21

## 2024-09-20 RX ORDER — PROCHLORPERAZINE EDISYLATE 5 MG/ML
5 INJECTION INTRAMUSCULAR; INTRAVENOUS EVERY 6 HOURS PRN
OUTPATIENT
Start: 2024-09-20

## 2024-09-20 RX ORDER — MUPIROCIN 20 MG/G
1 OINTMENT TOPICAL
OUTPATIENT
Start: 2024-09-20

## 2024-09-20 RX ORDER — LIDOCAINE HYDROCHLORIDE 10 MG/ML
1 INJECTION, SOLUTION EPIDURAL; INFILTRATION; INTRACAUDAL; PERINEURAL
OUTPATIENT
Start: 2024-09-20

## 2024-09-20 RX ORDER — MUPIROCIN 20 MG/G
1 OINTMENT TOPICAL 2 TIMES DAILY
OUTPATIENT
Start: 2024-09-20 | End: 2024-09-25

## 2024-09-20 RX ORDER — LOPERAMIDE HYDROCHLORIDE 2 MG/1
4 CAPSULE ORAL ONCE
OUTPATIENT
Start: 2024-09-20 | End: 2024-09-20

## 2024-09-20 NOTE — H&P
Van Fonseca is a 49 y.o. year old here today for preoperative visit in preparation for a Left total hip arthroplasty to be performed by Dr. Gonzales on 9/30/2024.  he was last seen and treated in the clinic for his right 2 week post op appt on 7/2/2024. he was medially optimized by PCP, no repeat clearance needed. There has been no significant change in medical status since last visit. No fever, chills, malaise, or unexplained weight change.      Allergies, Medications, past medical and surgical history reviewed.    Focused examination performed.    All questions answered. Patient encouraged to call with questions. Contact information given.     Pre, laron, and post operative procedures and expectations discussed. Goals of successful surgery reviewed and include manageable pain levels, surgical site free of infection, medication management, and ambulation with PT/OT assistance. Healthy weight management discussed with patient and caregiver who were receptive to eduction of healthy diet and activity. No other necessary lifestyle changes identified. Educated patient about signs and symptoms of infection, medication management, anticoagulation therapy, risk of tobacco and alcohol use, and self-care to promote healing. Surgical guide given for future reference. Hibiclens given to patient with instructions. All questions were answered.     Van Fonseca verbalized an understanding to the education and goals. Patient has displayed readiness to engage in care and is ready to proceed with surgery.  Patient reports family is able and ready to provide assistance at home after discharge.    Surgical and blood consents signed.    Van Fonseca will contact us if there are any questions, concerns, or changes in medical status prior to surgery.       Joint class: Done    1. Imaging results reviewed today and discussed with Vciente Gonzales MD. We reviewed with Van Fonseca today, the pathology and natural history of his diagnosis. We have  discussed a variety of treatment options including medications, physical therapy and other alternative treatments. I also explained the indications, risks and benefits of surgery. After discussion, he decided to proceed with surgery. The decision was made to go forward with:    Left total hip arthroplasty       The details of the surgical procedure were explained, including the location of probable incisions and a description of likely hardware and/or grafts to be used. The patient understands the likely convalescence after surgery. Also, we have thoroughly discussed the risks, benefits and alternatives to surgery, including, but not limited to, the risk of infection, joint stiffness, blood clot (including DVT and/or pulmonary embolus), neurologic and vascular injury.  It was explained that, if tissue has been repaired or reconstructed, there is a chance of failure, which may require further management.    I made the decision to obtain old records of the patient including previous notes and imaging. I independently reviewed and interpreted lab results today as well as prior imaging.       Post-Op Medications to be prescribed:   Percocet 5/325mg Take 1-2 tablets every 4-6 hours PRN pain #28  Zofran 4mg oral disintegrating tablets every 8 hours PRN nausea/vomiting   Motrin 600 mg TID PRN   ASA 81mg twice daily x 2 weeks     Medical Clearance: No- obtained previous  Hx of DVT,PE, anesthetic complications: No     Additional notes/concerns:  stop ASA and plavix 5 days pre op, resume ASA POD1, restart plavix POD5     Opioid Disclosure  Today we discussed the reasons why the prescription is necessary as well as: the risks of addiction and overdose associated with opioid drugs and the dangers of taking opioid drugs with alcohol, benzodiazepines and other central nervous system depressants; alternative treatments that may be available; the risks associated with the use of the drugs being prescribed, specifically that opioids  are highly addictive, even when taken as prescribed, that there is a risk of developing a physical or psychological dependence on the controlled dangerous substance, and that the risks of taking more opioids than prescribed or mixing sedatives, benzodiazepines or alcohol with opioids can result in fatal respiratory depression.      Patient has discussed discharge planning with surgeon. Patient will be discharged to home following surgery.      15 minutes of time was spent on patient education, review of records, templating, H&P, , appointment scheduling and optimizing patient for surgery.

## 2024-09-20 NOTE — PROGRESS NOTES
Van Fonseca is a 49 y.o. year old here today for preoperative visit in preparation for a Left total hip arthroplasty to be performed by Dr. Gonzales on 9/30/2024.  he was last seen and treated in the clinic for his right 2 week post op appt on 7/2/2024. he was medially optimized by PCP, no repeat clearance needed. There has been no significant change in medical status since last visit. No fever, chills, malaise, or unexplained weight change.      Allergies, Medications, past medical and surgical history reviewed.    Focused examination performed.    All questions answered. Patient encouraged to call with questions. Contact information given.     Pre, laron, and post operative procedures and expectations discussed. Goals of successful surgery reviewed and include manageable pain levels, surgical site free of infection, medication management, and ambulation with PT/OT assistance. Healthy weight management discussed with patient and caregiver who were receptive to eduction of healthy diet and activity. No other necessary lifestyle changes identified. Educated patient about signs and symptoms of infection, medication management, anticoagulation therapy, risk of tobacco and alcohol use, and self-care to promote healing. Surgical guide given for future reference. Hibiclens given to patient with instructions. All questions were answered.     Van Fonseca verbalized an understanding to the education and goals. Patient has displayed readiness to engage in care and is ready to proceed with surgery.  Patient reports family is able and ready to provide assistance at home after discharge.    Surgical and blood consents signed.    Van Fonseca will contact us if there are any questions, concerns, or changes in medical status prior to surgery.       Joint class: Done    1. Imaging results reviewed today and discussed with Vicente Gonzales MD. We reviewed with Van Fonseca today, the pathology and natural history of his diagnosis. We have  discussed a variety of treatment options including medications, physical therapy and other alternative treatments. I also explained the indications, risks and benefits of surgery. After discussion, he decided to proceed with surgery. The decision was made to go forward with:    Left total hip arthroplasty       The details of the surgical procedure were explained, including the location of probable incisions and a description of likely hardware and/or grafts to be used. The patient understands the likely convalescence after surgery. Also, we have thoroughly discussed the risks, benefits and alternatives to surgery, including, but not limited to, the risk of infection, joint stiffness, blood clot (including DVT and/or pulmonary embolus), neurologic and vascular injury.  It was explained that, if tissue has been repaired or reconstructed, there is a chance of failure, which may require further management.    I made the decision to obtain old records of the patient including previous notes and imaging. I independently reviewed and interpreted lab results today as well as prior imaging.       Post-Op Medications to be prescribed:   Percocet 5/325mg Take 1-2 tablets every 4-6 hours PRN pain #28  Zofran 4mg oral disintegrating tablets every 8 hours PRN nausea/vomiting   Motrin 600 mg TID PRN   ASA 81mg twice daily x 2 weeks     Medical Clearance: No- obtained previous  Hx of DVT,PE, anesthetic complications: No     Additional notes/concerns:  stop ASA and plavix 5 days pre op, resume ASA POD1, restart plavix POD5     Opioid Disclosure  Today we discussed the reasons why the prescription is necessary as well as: the risks of addiction and overdose associated with opioid drugs and the dangers of taking opioid drugs with alcohol, benzodiazepines and other central nervous system depressants; alternative treatments that may be available; the risks associated with the use of the drugs being prescribed, specifically that opioids  are highly addictive, even when taken as prescribed, that there is a risk of developing a physical or psychological dependence on the controlled dangerous substance, and that the risks of taking more opioids than prescribed or mixing sedatives, benzodiazepines or alcohol with opioids can result in fatal respiratory depression.      Patient has discussed discharge planning with surgeon. Patient will be discharged to home following surgery.      15 minutes of time was spent on patient education, review of records, templating, H&P, , appointment scheduling and optimizing patient for surgery.

## 2024-09-26 DIAGNOSIS — M16.12 PRIMARY OSTEOARTHRITIS OF LEFT HIP: Primary | ICD-10-CM

## 2024-09-30 DIAGNOSIS — G89.18 ACUTE POST-OPERATIVE PAIN: ICD-10-CM

## 2024-09-30 RX ORDER — ASPIRIN 81 MG/1
81 TABLET ORAL 2 TIMES DAILY
Qty: 60 TABLET | Refills: 0 | Status: SHIPPED | OUTPATIENT
Start: 2024-09-30 | End: 2025-09-30

## 2024-09-30 RX ORDER — ONDANSETRON 4 MG/1
4 TABLET, ORALLY DISINTEGRATING ORAL 2 TIMES DAILY
Qty: 30 TABLET | Refills: 0 | Status: SHIPPED | OUTPATIENT
Start: 2024-09-30

## 2024-09-30 RX ORDER — OXYCODONE AND ACETAMINOPHEN 5; 325 MG/1; MG/1
1 TABLET ORAL EVERY 4 HOURS PRN
Qty: 28 EACH | Refills: 0 | Status: SHIPPED | OUTPATIENT
Start: 2024-09-30

## 2024-09-30 RX ORDER — IBUPROFEN 600 MG/1
600 TABLET ORAL 3 TIMES DAILY
Qty: 60 TABLET | Refills: 1 | Status: SHIPPED | OUTPATIENT
Start: 2024-09-30

## 2024-09-30 RX ORDER — DOXYCYCLINE 100 MG/1
100 CAPSULE ORAL 2 TIMES DAILY
Qty: 14 CAPSULE | Refills: 0 | Status: SHIPPED | OUTPATIENT
Start: 2024-09-30

## 2024-10-08 DIAGNOSIS — Z96.642 S/P HIP REPLACEMENT, LEFT: Primary | ICD-10-CM

## 2024-10-09 DIAGNOSIS — G89.18 ACUTE POST-OPERATIVE PAIN: ICD-10-CM

## 2024-10-09 RX ORDER — OXYCODONE AND ACETAMINOPHEN 5; 325 MG/1; MG/1
1 TABLET ORAL EVERY 4 HOURS PRN
Qty: 28 EACH | Refills: 0 | Status: SHIPPED | OUTPATIENT
Start: 2024-10-09

## 2024-10-09 RX ORDER — IBUPROFEN 600 MG/1
600 TABLET ORAL 3 TIMES DAILY
Qty: 60 TABLET | Refills: 1 | Status: SHIPPED | OUTPATIENT
Start: 2024-10-09

## 2024-10-09 NOTE — TELEPHONE ENCOUNTER
----- Message from Eloisa sent at 10/9/2024 12:41 PM CDT -----  Type:  RX Refill Request    Who Called:  Pt  Refill or New Rx: Refill  RX Name and Strength: oxyCODONE-acetaminophen (PERCOCET) 5-325 mg per tablet & ibuprofen (ADVIL,MOTRIN) 600 MG tablet  How is the patient currently taking it? (ex. 1XDay):  Is this a 30 day or 90 day RX:  Preferred Pharmacy with phone number: [facility] [place] - [place], [place] - [address] [place] [address].  Local or Mail Order: Local  Ordering Provider: Dr. Gonzales  Would the patient rather a call back or a response via [facility]? Call  Best Call Back Number:  [phone]  Additional Information:  Pt would like to speak to someone in office related to a refill of above medications, as pt is currently out

## 2024-10-10 ENCOUNTER — TELEPHONE (OUTPATIENT)
Dept: ORTHOPEDICS | Facility: CLINIC | Age: 49
End: 2024-10-10
Payer: MEDICAID

## 2024-10-10 NOTE — TELEPHONE ENCOUNTER
----- Message from Calderon Tristan sent at 10/10/2024 10:16 AM CDT -----  Yes please  ----- Message -----  From: Francseca Johnson LPN  Sent: 10/10/2024   9:52 AM CDT  To: Azalea Slade MA    Got him in next Friday. [facility] full next Tuesday. You need me to call him?  ----- Message -----  From: Azalea Slade MA  Sent: 10/10/2024   9:21 AM CDT  To: Janet Zhang Staff    Good morning, this pt need to be seen for a 2wk post-op he was scheduled today but didn't show up when I called his wife want to know if he can be seen tomorrow or next week. Can you please schedule him with Janet next week please.

## 2024-10-18 ENCOUNTER — OFFICE VISIT (OUTPATIENT)
Dept: ORTHOPEDICS | Facility: CLINIC | Age: 49
End: 2024-10-18
Payer: MEDICAID

## 2024-10-18 ENCOUNTER — HOSPITAL ENCOUNTER (OUTPATIENT)
Dept: RADIOLOGY | Facility: HOSPITAL | Age: 49
Discharge: HOME | End: 2024-10-18
Payer: MEDICAID

## 2024-10-18 VITALS — HEIGHT: 74 IN | BODY MASS INDEX: 32.68 KG/M2 | WEIGHT: 254.63 LBS

## 2024-10-18 DIAGNOSIS — Z96.642 S/P HIP REPLACEMENT, LEFT: Primary | ICD-10-CM

## 2024-10-18 DIAGNOSIS — Z96.642 S/P TOTAL LEFT HIP ARTHROPLASTY: ICD-10-CM

## 2024-10-18 PROCEDURE — 99212 OFFICE O/P EST SF 10 MIN: CPT | Mod: PBBFAC,PO | Performed by: ORTHOPAEDIC SURGERY

## 2024-10-18 PROCEDURE — 73501 X-RAY EXAM HIP UNI 1 VIEW: CPT | Mod: TC,LT

## 2024-10-18 PROCEDURE — 99999 PR PBB SHADOW E&M-EST. PATIENT-LVL II: CPT | Mod: PBBFAC,,, | Performed by: ORTHOPAEDIC SURGERY

## 2024-10-18 PROCEDURE — 73501 X-RAY EXAM HIP UNI 1 VIEW: CPT | Mod: 26,LT,, | Performed by: RADIOLOGY

## 2024-10-18 NOTE — LETTER
October 18, 2024    [facility] [id number]         [facility]  [facility] [address]  Phone: [phone] October 18, 2024     Patient: Van Fonseca   YOB: 1975   Date of Visit: 10/18/2024       To Whom It May Concern:    It is my medical opinion that Van Fonseca may return to light duty immediately with the following restrictions: NO Bending at the waist, no extended stand /walk .    If you have any questions or concerns, please don't hesitate to call.    Sincerely,        Vicente Gonzales MD

## 2024-10-18 NOTE — PROGRESS NOTES
Post-op Note    HPI    Van Fonseca is here 3 weeks s/p the following procedure:     We will hip replacement    Overall doing well. Pain controlled on current regimen. He is not currently enrolled in outpatient PT.. Denies any chest pain or shortness of breathe. Denies any drainage from the incision. Denies any fevers, chills or paresthesias.     DVT Prophylaxis:  Aspirin      Physical Exam:     Patient is alert and oriented no acute distress.   Assistive Device:  Cane    Left posterolateral Incision(s) are well healed.  There is no evidence of dehiscence.  There is no induration erythema or signs of infection.  Appropriate soft tissue swelling.  Compartments are soft and compressible.  Warm well-perfused extremity.  Walking with a mildly antalgic gait.  No significant pain with internal and external rotation of the hip    Imaging:     I have personally reviewed the following imaging and these are an interpretation of my findings:     X-Ray: I have reviewed all pertinent results/findings and my personal findings are:  Status post bilateral IFRHA with no complication.  Appropriate alignment    Assessment    Van Fonseca is 3 weeks Post-op     Plan:    Overall doing as expected.  We discussed expectations of surgery and postoperative course.     Pain: Continued postoperative pain regimen -- Tylenol/ibuprofen  DVT prophylaxis:  Aspirin 30 days postop  PT/OT: Continue/Initiate physical therapy (weight bearing status:  As tolerated), no restriction.  Posterior hip precautions 6 weeks postop     Follow-up: 6 weeks   X-rays next visit:  Bilateral hips

## 2024-10-21 ENCOUNTER — EXTERNAL HOME HEALTH (OUTPATIENT)
Dept: HOME HEALTH SERVICES | Facility: HOSPITAL | Age: 49
End: 2024-10-21
Payer: MEDICAID

## 2024-11-21 DIAGNOSIS — G89.18 ACUTE POST-OPERATIVE PAIN: ICD-10-CM

## 2024-11-21 RX ORDER — OXYCODONE AND ACETAMINOPHEN 5; 325 MG/1; MG/1
1 TABLET ORAL EVERY 4 HOURS PRN
Qty: 28 EACH | Refills: 0 | Status: SHIPPED | OUTPATIENT
Start: 2024-11-21

## 2024-11-21 NOTE — TELEPHONE ENCOUNTER
----- Message from Colin sent at 11/21/2024 10:38 AM CST -----  Type:  RX Refill Request    Who Called:  pt  Refill or New Rx: refill  RX Name and Strength: oxyCODONE-acetaminophen (PERCOCET) 5-325 mg per tablet 28 each 0; ibuprofen (ADVIL,MOTRIN) 600 MG tablet 60 tablet 1    Preferred Pharmacy:    Ordering Provider:  Reach pt via:  Best Call Back Number:  [phone]  Additional Information:

## 2024-12-02 DIAGNOSIS — Z96.642 S/P HIP REPLACEMENT, LEFT: Primary | ICD-10-CM

## 2024-12-06 ENCOUNTER — OFFICE VISIT (OUTPATIENT)
Dept: ORTHOPEDICS | Facility: CLINIC | Age: 49
End: 2024-12-06
Payer: MEDICAID

## 2024-12-06 ENCOUNTER — HOSPITAL ENCOUNTER (OUTPATIENT)
Dept: RADIOLOGY | Facility: HOSPITAL | Age: 49
Discharge: HOME | End: 2024-12-06
Attending: ORTHOPAEDIC SURGERY
Payer: MEDICAID

## 2024-12-06 DIAGNOSIS — Z96.642 S/P HIP REPLACEMENT, LEFT: Primary | ICD-10-CM

## 2024-12-06 DIAGNOSIS — Z96.642 S/P HIP REPLACEMENT, LEFT: ICD-10-CM

## 2024-12-06 PROCEDURE — 73521 X-RAY EXAM HIPS BI 2 VIEWS: CPT | Mod: TC

## 2024-12-06 PROCEDURE — 73521 X-RAY EXAM HIPS BI 2 VIEWS: CPT | Mod: 26,,, | Performed by: RADIOLOGY

## 2024-12-06 PROCEDURE — 99999 PR PBB SHADOW E&M-EST. PATIENT-LVL II: CPT | Mod: PBBFAC,,, | Performed by: ORTHOPAEDIC SURGERY

## 2024-12-06 PROCEDURE — 99212 OFFICE O/P EST SF 10 MIN: CPT | Mod: PBBFAC,PO | Performed by: ORTHOPAEDIC SURGERY

## 2024-12-06 NOTE — PROGRESS NOTES
Post-op Note    HPI    Van Fonseca is here 9 weeks s/p the following procedure:     Left hip replacement    Overall doing well. Pain controlled on current regimen.  Getting around well without assistive device.  Some occasional soreness but feels like this is getting better.  Able to climb steps and ladders.  Back at work full-time.      Physical Exam:     Patient is alert and oriented no acute distress.   Assistive Device:  None    Left posterolateral Incision(s) are well healed.  There is no evidence of dehiscence.  There is no induration erythema or signs of infection.  Appropriate soft tissue swelling.  Compartments are soft and compressible.  Warm well-perfused extremity.  Walking with a non antalgic gait.  No significant pain with internal and external rotation of the hip bilaterally    Imaging:     I have personally reviewed the following imaging and these are an interpretation of my findings:     X-Ray: I have reviewed all pertinent results/findings and my personal findings are:  Status post bilateral IFRAH with no complication.  Appropriate alignment    Assessment    Van Fonseca is 9 weeks Post-op left IFRAH    Plan:    Overall doing as expected.  We discussed expectations of surgery and postoperative course.     Pain: Continued postoperative pain regimen -- Tylenol/ibuprofen  DVT prophylaxis:  Aspirin 30 days postop complete  PT/OT: Continue/Initiate physical therapy (weight bearing status:  As tolerated), no restriction.  No restrictions    Follow-up: 12 weeks   X-rays next visit:  Bilateral hips

## 2025-01-02 ENCOUNTER — HOSPITAL ENCOUNTER (EMERGENCY)
Facility: HOSPITAL | Age: 50
Discharge: HOME | End: 2025-01-02
Attending: EMERGENCY MEDICINE
Payer: MEDICAID

## 2025-01-02 VITALS
OXYGEN SATURATION: 100 % | HEIGHT: 74 IN | HEART RATE: 77 BPM | TEMPERATURE: 98 F | WEIGHT: 270 LBS | DIASTOLIC BLOOD PRESSURE: 83 MMHG | BODY MASS INDEX: 34.65 KG/M2 | RESPIRATION RATE: 18 BRPM | SYSTOLIC BLOOD PRESSURE: 145 MMHG

## 2025-01-02 DIAGNOSIS — Z45.02 AICD DISCHARGE: Primary | ICD-10-CM

## 2025-01-02 DIAGNOSIS — Z95.810 AICD PROBLEM: ICD-10-CM

## 2025-01-02 DIAGNOSIS — T82.9XXA AICD PROBLEM: ICD-10-CM

## 2025-01-02 LAB
OHS QRS DURATION: 104 MS
OHS QTC CALCULATION: 453 MS

## 2025-01-02 PROCEDURE — 93010 ELECTROCARDIOGRAM REPORT: CPT | Mod: ,,, | Performed by: INTERNAL MEDICINE

## 2025-01-02 PROCEDURE — 93005 ELECTROCARDIOGRAM TRACING: CPT | Mod: ER

## 2025-01-02 PROCEDURE — 99283 EMERGENCY DEPT VISIT LOW MDM: CPT | Mod: 25,ER

## 2025-01-02 PROCEDURE — 99900035 HC TECH TIME PER 15 MIN (STAT): Mod: ER

## 2025-01-02 NOTE — DISCHARGE INSTRUCTIONS
Thank you for letting me care for you today - it was nice to meet you and I hope you feel better soon. Please follow up with your cardiologist ASAP.    Our goal at [facility] is to always give you outstanding care and exceptional service. You may receive a survey by mail or email in the next week about your experience in our ED. We would greatly appreciate you completing and returning the survey. Your feedback provides us with a way to recognize our staff who give very good care and it helps us learn how to improve when your experience was below our aspiration of excellence.     All the best,     Page Gabriel, MPH, PA-C  Emergency Department Physician Assistant  [facility], [facility]

## 2025-01-04 NOTE — ED PROVIDER NOTES
"Encounter Date: 1/2/2025       History     Chief Complaint   Patient presents with    Pacemaker Problem     PT reports pacemaker made a noise that he never heard before. Denies being shocked. Pt denies SOB or chest pain     Patient is a 49 y.o. male who presents to the ED for evaluation of possible problem with his AICD. States that this morning around 7am, he heard his AICD that sounded like a "phone ring tone". He did not receive a call from [employer] after the incident. His cardiology team is at the [facility]. Says that he has never heard a noise from the AICD before. He denies any knowledge of being shocked. He states that he is entirely asymptomatic at this time. Patient states that he tried to call his cardiologist this morning, but wasn't able to get a hold of them, so he came to the ED to have his AICD checked.      Denies chest pain, SOB, fever, chills, cough, wheezing, stridor, left arm pain, jaw pain, palpitations, leg swelling, claudication, dyspnea, fatigue, irregular heart beat, lower extremity edema, near-syncope, orthopnea, palpitations, paroxysmal nocturnal dyspnea, syncope, and tachypnea. or any other complaints at this time.     The history is provided by the patient.     Review of patient's allergies indicates:  No Known Allergies  Past Medical History:   Diagnosis Date    CAD (coronary artery disease)     Cardiomyopathy     Essential (primary) hypertension     High cholesterol     Pre-diabetes     ST elevation (STEMI) myocardial infarction of unspecified site 11/2019     Past Surgical History:   Procedure Laterality Date    CARDIAC DEFIBRILLATOR PLACEMENT      CORONARY ANGIOPLASTY WITH STENT PLACEMENT N/A 11/2019    HIP ARTHROPLASTY Right 06/17/2024    Procedure: ARTHROPLASTY, HIP;  Surgeon: Vicente Gonzales MD;  Location: [facility];  Service: Orthopedics;  Laterality: Right;  right donte IFRAH    HIP ARTHROPLASTY Left 9/30/2024    Procedure: ARTHROPLASTY, HIP;  Surgeon: " Vicente Gonzales MD;  Location: [facility] OR;  Service: Orthopedics;  Laterality: Left;  left IFRAH, donte ortho, peg board    TONSILLECTOMY      TOTAL HIP ARTHROPLASTY Left 09/30/2024     No family history on file.  Social History     Tobacco Use    Smoking status: Some Days     Types: Cigars    Smokeless tobacco: Never   Substance Use Topics    Alcohol use: Yes     Comment: 2- 3 x a week    Drug use: No     Review of Systems   Constitutional:  Negative for chills and fever.   Respiratory:  Negative for cough, chest tightness, shortness of breath and wheezing.    Cardiovascular:  Negative for chest pain, palpitations and leg swelling.   Gastrointestinal:  Negative for nausea and vomiting.   Musculoskeletal:  Negative for back pain.   Skin:  Negative for pallor and rash.   Neurological:  Negative for syncope, weakness and light-headedness.   Hematological:  Does not bruise/bleed easily.       Physical Exam     Initial Vitals [01/02/25 0843]   BP Pulse Resp Temp SpO2   (!) 145/83 77 18 98.3 °F (36.8 °C) 100 %      MAP       --         Physical Exam    Vitals reviewed.  Constitutional: He appears well-developed and well-nourished.   HENT:   Head: Normocephalic and atraumatic.   Nose: Rhinorrhea present. Mouth/Throat: Uvula is midline. No oropharyngeal exudate, posterior oropharyngeal edema or posterior oropharyngeal erythema.   Eyes: EOM are normal. Pupils are equal, round, and reactive to light.   Neck:   Normal range of motion.  Cardiovascular:  Normal rate, regular rhythm and normal heart sounds.           Pulmonary/Chest: Breath sounds normal. No respiratory distress. He has no wheezes. He has no rhonchi. He has no rales.   Musculoskeletal:      Cervical back: Normal range of motion.     Neurological: He is alert and oriented to person, place, and time.   Skin: No pallor.         ED Course   Procedures  Labs Reviewed - No data to display     ECG Results              EKG 12-lead (Final result)        Collection Time  Result Time QRS Duration OHS QTC Calculation    25 09:36:11 25 15:23:20 104 453                     Final result by Interface, Lab In [facility] (25 15:23:25)                   Narrative:    Test Reason : T82.9XXA,Z95.810,    Vent. Rate :  75 BPM     Atrial Rate :  75 BPM     P-R Int : 172 ms          QRS Dur : 104 ms      QT Int : 406 ms       P-R-T Axes :  62  28 -33 degrees    QTcB Int : 453 ms    Normal sinus rhythm  Anteroseptal infarct ,age undetermined  T wave abnormality, consider inferior ischemia  Abnormal ECG  No previous ECGs available  Confirmed by Gagandeep Villegas ([id number]) on 2025 3:23:14 PM    Referred By: AAAREFERRAL SELF           Confirmed By: Gagandeep Villegas                                  Imaging Results    None          Medications - No data to display  Medical Decision Making  Patient is an afebrile well-appearing 49 y.o. male who presents for evaluation of AICD problem. Denies any symptoms, denies knowledge of being shocked. VSS. The patient remained comfortable and stable during their visit in the ED. Details of ED course documented in ED workup.     Differential diagnosis includes but is not limited to: AICD malfunction, VF, VT, arrhythmia, ACS, PE, CHF exacerbation, valvular heart disease, pericarditis, pericardial tamponade, HOCM, aortic dissection, aortic aneurysm, coronary aneurysm, costochondritis, pleurisy, mediastinitis, COVID, influenza, viral URI, pneumonia, bronchitis, COPD exacerbation, pneumothorax, GERD, esophageal spasm    After complete evaluation, including thorough history and physical exam, I do not believe the patient has ACS or any major cardiac condition at this time.     Management:  - EK beats per minute, normal sinus rhythm, no ST elevations, normal axis     After discussion with [employer], it does seem as though patient received a shock around 4:27am. I spoke with patient's cardiology team at [facility] who will arrange to see patient  in clinic. His cardiology team and the [employer] team says that the shock was appropriate and state that if patient is asymptomatic that he is OK for discharge. My attending physician, Dr. North, is in agreement with this plan. Patient is very eager to leave the ED to get to work.       Patient has been counseled regarding the need for follow-up as well as the indication to return to the emergency room should new or worrisome developments occur, with emphasis on signs and symptoms of arrhythmia, ACS, failure to tolerate PO intake, fever/chills/diaphoresis, intractable pain, or any other concerning symptoms.. The patient verbalized an understanding. The patient is asked if there are any questions or concerns. Patient given discharge instructions. We discuss the case, until all issues are addressed to the patient's satisfaction. Patient understands and is agreeable to the plan. Case discussed with Supervision Physician, who is in agreement with plan.       Amount and/or Complexity of Data Reviewed  External Data Reviewed: notes.     Details: [employer] info               ED Course as of 25 0934   Thu 2025   0853 [employer] AICD [OB]   0938 I independently interpreted the EK beats per minute, normal sinus rhythm, no ST elevations, normal axis   Kim North MD [JA]   0949 Speaking with [employer] Rep. Shocked at 4:27am.     [place] [employer] rep: [phone] [OB]   0909 Speaking with patient's cardiology team at   [facility]      [OB]   1016 Spoke with [facility] team who have arranged a follow up appt for next week. Patient continues to have no symptoms. Patient is eager to leave the ED because he needs to get to work. I discussed with my attending physician, Dr. North, who does not believe that patient needs further workup at this time, as he is entirely asymptomatic. Patient will be discharged with very strict return precautions.  [OB]       ED Course User Index  [JA] Kim North MD  [OB] Page Gabriel PA-C                           Clinical Impression:  Final diagnoses:  [T82.9XXA, Z95.810] AICD problem  [Z45.02] AICD discharge (Primary)          ED Disposition Condition    Discharge Stable          ED Prescriptions    None       Follow-up Information    None          Page Gabriel PA-C  01/04/25 0934

## 2025-03-05 DIAGNOSIS — Z96.642 S/P HIP REPLACEMENT, LEFT: Primary | ICD-10-CM
